# Patient Record
Sex: FEMALE | Race: WHITE
[De-identification: names, ages, dates, MRNs, and addresses within clinical notes are randomized per-mention and may not be internally consistent; named-entity substitution may affect disease eponyms.]

---

## 2017-07-01 ENCOUNTER — HEALTH MAINTENANCE LETTER (OUTPATIENT)
Age: 52
End: 2017-07-01

## 2019-04-29 ENCOUNTER — TELEPHONE (OUTPATIENT)
Dept: SURGERY | Facility: CLINIC | Age: 54
End: 2019-04-29

## 2019-05-01 ENCOUNTER — TELEPHONE (OUTPATIENT)
Dept: SURGERY | Facility: CLINIC | Age: 54
End: 2019-05-01

## 2019-05-01 ENCOUNTER — OFFICE VISIT (OUTPATIENT)
Dept: SURGERY | Facility: CLINIC | Age: 54
End: 2019-05-01
Payer: COMMERCIAL

## 2019-05-01 VITALS
OXYGEN SATURATION: 97 % | BODY MASS INDEX: 40.87 KG/M2 | HEIGHT: 64 IN | WEIGHT: 239.4 LBS | TEMPERATURE: 97.9 F | DIASTOLIC BLOOD PRESSURE: 76 MMHG | SYSTOLIC BLOOD PRESSURE: 117 MMHG | HEART RATE: 75 BPM

## 2019-05-01 DIAGNOSIS — E66.01 MORBID OBESITY (H): Primary | ICD-10-CM

## 2019-05-01 ASSESSMENT — MIFFLIN-ST. JEOR: SCORE: 1661.94

## 2019-05-01 NOTE — PATIENT INSTRUCTIONS
"GOALS:  Relating To Eating:  Eat slowly (20-30 minutes per meal), chewing foods well (25 chews per bite/applesauce consistency)  9\" Plate method (1/2 plate non-starchy vegetables/fruit, 1/4 plate lean protein, 1/4 plate whole grain starch - no more than 1/2 cup carb/meal)  Eat 3 meals per day   -If intermittent fasting try a 16 hour fast: meals from 10am-6 pm    Relating to beverages:  Reduce caffeine/carbonation/calorie containing beverages    Relating to dietary supplements:  Start a multivitamin containing iron daily    Relating to activity:  Increase activity level.  Exercise 3-4 days/week for 45 minutes    Follow up with RD in one month    Stephania Michel, MS, RD, LD  If you need to schedule or reschedule with a dietitian please call [phone].  "

## 2019-05-01 NOTE — NURSING NOTE
"(   Chief Complaint   Patient presents with     Consult     NBS    )    ( Weight: 108.6 kg (239 lb 6.4 oz) )  ( Height: 161.9 cm (5' 3.75\") )  ( BMI (Calculated): 41.42 )  ( Initial Weight: 108.6 kg (239 lb 6.4 oz) )  ( Cumulative weight loss (lbs): 0 )  (   )  (   )  ( Waist Circumference (cm): 126.5 cm )  (   )    ( BP: 117/76 )  (   )  ( Temp: 97.9  F (36.6  C) )  ( Temp src: Oral )  ( Pulse: 75 )  (   )  ( SpO2: 97 % )    ( There is no problem list on file for this patient.   )  (   No current outpatient medications on file.    )  ( Diabetes Eval:    )    ( Pain Eval:  Data Unavailable )    ( Wound Eval:       )    (   History   Smoking Status     Never Smoker   Smokeless Tobacco     Never Used    )    ( Signed By:  Pinky Hallman; May 1, 2019; 8:01 AM )    "

## 2019-05-01 NOTE — PROGRESS NOTES
"New Bariatric Surgery Consultation Note    May 1, 2019    RE: Nery Bustos  MR#: [id number]  : 1965      Referring provider: self referred    Chief Complaint/Reason for visit: evaluation for possible weight loss surgery    Dear Clinic, Diego Jasso (General),    I had the pleasure of seeing your patient, Nery Bustos, to evaluate her obesity and consider her for possible weight loss surgery. As you know, Nery Bustos is 53 year old.  She has a height of 5' 3.75\", a weight of 239 lbs 6.4 oz, and calculated Body mass index is 41.42 kg/m .    HISTORY OF PRESENT ILLNESS:  Weight Loss History Reviewed with Patient 2019   How long have you been overweight? Following one or more pregnancies   What is the most that you have ever weighed? 238   What is the most weight you have lost? 30   I have tried the following methods to lose weight Watching portions or calories, Exercise, Weight Watchers, Atkins type diet (low carb/high protein), OTC Medications   I have tried the following weight loss medications? (Check all that apply) None   Have you ever had weight loss surgery? No   Lost 30 lbs with [facility] in her 20's    CO-MORBIDITIES OF OBESITY INCLUDE:     2019   I have the following co-morbidities associated with obesity: None of the above   Tested for sleep apnea over 10 years ago and didn't have it. No loud snoring    PAST MEDICAL HISTORY:  Past Medical History:   Diagnosis Date     Fracture 2017       PAST SURGICAL HISTORY:  Past Surgical History:   Procedure Laterality Date     ABDOMEN SURGERY  , , ,            FAMILY HISTORY:   Family History   Problem Relation Age of Onset     Substance Abuse Brother         Consistent overuse     Substance Abuse Mother          of alcoholism, heart attack     Substance Abuse Brother          while under influence     Substance Abuse Other          due to alcohol complications in 40's       SOCIAL HISTORY: "   Social History Questions Reviewed With Patient 4/30/2019   Which best describes your employment status (select all that apply) I work full-time, I work days, I work evenings   If you work, what is your occupation?    Which best describes your marital status:    Do you have children? Yes   Who do you have in your support network that can be available to help you for the first 2 weeks after surgery? Daughters, Friend   Who can you count on for support throughout your weight loss surgery journey? Daughters, friends   Can you afford 3 meals a day?  Yes   Can you afford 50-60 dollars a month for vitamins? Yes        HABITS:     4/30/2019   How often do you drink alcohol? Monthly or less   Have you or are you currently using street drugs or prescription strength medication for which you do not have a prescription for? No   Do you have a history of chemical dependency (alcohol or drug abuse)? No       PSYCHOLOGICAL HISTORY:   Psychological History Reviewed With Patient 4/30/2019   Have you ever attempted suicide? Never.   Have you had thoughts of suicide in the past year? No   Have you ever been hospitalized for mental illness or a suicide attempt? Never.   Do you have a history of chronic pain? No   Have you ever been diagnosed with fibromyalgia? No   Are you currently seeing a therapist or counselor?  Yes   Are you currently seeing a psychiatrist? No       ROS:     4/30/2019   Skin:  Leg swelling   HEENT: Missing teeth, Teeth, dentures, or bridges needing repairs   If you answered yes to missing teeth, please indicate how many: 3   Musculoskeletal: None of the above   Cardiovascular: Shortness of breath with activity   Pulmonary: Awaken from sleep to catch your breath   Gastrointestinal: None of the above   Genitourinary: None of the above   Hematological: None of the above   Neurological: None of the above   Female only: Irregular menstrual cycles       EATING BEHAVIORS:     4/30/2019  "  Have you or anyone else thought that you had an eating disorder? No   Do you currently binge eat (eat a large amount of food in a short time)? No   Are you an emotional eater? No   Do you get up to eat after falling asleep? No       EXERCISE:     4/30/2019   How often do you exercise? 3 to 4 times per week   What is the duration of your exercise (in minutes)? 45 Minutes   What types of exercise do you do? walking, home gym, weightlifting   What keeps you from being more active?  Lack of Time, Too tired       MEDICATIONS:  No current outpatient medications on file.       ALLERGIES:  No Known Allergies    LABS/IMAGING/MEDICAL RECORDS REVIEW:     PHYSICAL EXAM:  /76   Pulse 75   Temp 97.9  F (36.6  C) (Oral)   Ht 1.619 m (5' 3.75\")   Wt 108.6 kg (239 lb 6.4 oz)   SpO2 97%   BMI 41.42 kg/m    General: NAD  Neurologic: A & O x 3, gait normal  Head: normocephalic, atraumatic  HEENT: PERRL, EOMI.   Respiratory: respirations unlabored  Abdomen: Obese, Soft NT ND   Extremities: No LE swelling   Skin: warm and dry.  No rashes on exposed skin  Psychiatric: Mentation and Affect appear normal      In summary, Nery Bustos has Class III obesity with a body mass index of Body mass index is 41.42 kg/m . kg/m2 and the comorbidities stated above. She completed an informational seminar and is a candidate for the laparoscopic gastric sleeve.  She will have to complete the following pre-requisites:  If you have not already watched our online seminar please go to [email or web address]    See dietitian in 1 month and monthly for 6 months    Bariatric labs today first floor    Schedule bariatric psych eval with Teetee To at  today OR Call Rich [phone] to schedule with Samantha Cosme    Bariatric Task List  Status:  Is patient a candidate for bariatric surgery?:  patient is a candidate for bariatric surgery -     Status:  surgery evaluation in process -     Surgeon: Mirian -     Tentative surgery month/year: " "Nov 2019 -        Insurance: Insurance:  [facility] -        Patient Info: Initial Weight:  239 -     Date of Initial Weight/Height:  5/1/2019 -     Goal Weight (lbs):  229 -     Required Weight Loss:  10 -     Surgery Type:  sleeve gastrectomy -        Dietician Visits: Structured weight loss required by insurance?:  structured weight loss required -     Dietician Visit 1:  Completed -     Dietician Visit 2:  Needed -     Dietician Visit 3:  Needed -     Dietician Visit 4:  Needed -     Dietician Visit 5:  Needed -     Dietician Visit 6:  Needed -        Psychological Evaluation: Psych eval:  Needed -     Therapist letter of support:  Needed -        Lab Work: Complete Blood Count:  Needed -     Comprehensive Metabolic Panel:  Needed -     Vitamin D:  Needed -     Hgb A1c:  Needed -     PTH:  Needed -        Consults/ Clearance: Cardiac:  Needed -        PCP: Establish care with PCP:  Completed -     PCP letter of support:  Needed -        Smoking:    Patient Education:  Information Session:  Completed -     Given \"Making your decision\" handout?:  Given \"\"Making your decision\"\" handout? -     Given support group information?:  support group contact information provided -     Support plan in place?:  Completed -     Research consents signed?:  research consent signed -        Final Tasks:  Before surgery online class:  Needed -     Before surgery online class website link:  [email or web address]   After surgery online class:  Needed -     After surgery online class website link:  [email or web address]   Nurse visit for weigh-in and information:  Needed -     Pre-assessment clinic visit with anesthesia team for H&P:  Needed -     Final labs (Hgb, plt, T&S, UA):  Needed -        Notes:   -         Today in the office we discussed gastric sleeve surgery. Preoperative, perioperative, and postoperative processes, management, and follow up were addressed.  Risks and benefits were outlined " including the risk of death, staple line leak (1-2%), PE, DVT, ulcer, worsening GERD, N/V, stricture, hernia, wound infection, weight regain, and vitamin deficiencies. I emphasized exercise and activity along with appropriate food choice as the main foundation for weight loss with surgery providing surgical reinforcement of this.  All questions were answered.  A goal sheet and support group handout were given to the patient.      Once the patient has completed the requirements in their task list and there are no further recommendations, the pt will be allowed to see the surgeon of her choice for consultation on the laparoscopic gastric sleeve surgery. Patient verbalizes understanding of the process to surgery and expectations for the postoperative period including the need for lifelong lifestyle changes, vitamin supplementation, and laboratory monitoring.        Sincerely,     Janette Duran PA-C    I spent a total of 30 minutes face to face with the patient during today's office visit. Over 50% of this time was spent counseling the patient and/or coordinating care.

## 2019-05-01 NOTE — LETTER
"5/1/2019       RE: Nery Bustos  [address]  [facility] [address]     Dear Colleague,    Thank you for referring your patient, Nery Bustos, to the [facility] SURGICAL WEIGHT MANAGEMENT at [facility]. Please see a copy of my visit note below.    New Bariatric Nutrition Consultation Note    Reason For Visit: Nutrition Assessment    Nery Bustos is a 53 year old presenting today for new bariatric nutrition consult.  Pt is interested in laparoscopic sleeve gastrectomy with Dr. Tarango expected surgery in Nov 2019.  Patient is accompanied by self.  This is pt's first of 6 required nutrition visits prior to surgery. Pt referred by OBEY Baker (5/1/19).     Support System Reviewed With Patient 4/30/2019   Who do you have in your support network that can be available to help you for the first 2 weeks after surgery? Daughters, Friend   Who can you count on for support throughout your weight loss surgery journey? Daughters, friends       ANTHROPOMETRICS:  Estimated body mass index is 41.42 kg/m  as calculated from the following:    Height as of an earlier encounter on 5/1/19: 1.619 m (5' 3.75\").    Weight as of an earlier encounter on 5/1/19: 108.6 kg (239 lb 6.4 oz).    Required weight loss goal pre-op: 10 lbs from initial consult weight (goal weight 229 lbs or less before surgery)       4/30/2019   I have tried the following methods to lose weight Watching portions or calories, Exercise, Weight Watchers, Atkins type diet (low carb/high protein), OTC Medications       Weight Loss Questions Reviewed With Patient 4/30/2019   How long have you been overweight? Following one or more pregnancies       SUPPLEMENT INFORMATION:  None    NUTRITION HISTORY:  Recall Diet Questions Reviewed With Patient 4/30/2019   Describe what you typically consume for breakfast (typical or most recent): Green juice, eggs, brown rice, spinach, omelets   Describe what you " typically consume for lunch (typical or most recent): unwich from [employer], crisp and green   Describe what you typically consume for supper (typical or most recent): Salad, chicken, steak, veggies, sometimes Asian food (from a resturant, chicken and veggies typically)    Describe what you typically consume as snacks (typical or most recent): Cheese, nuts, sometimes chips ( 2 snacks per day)    How many ounces of water, or other low calorie drinks, do you drink daily (8 oz=1 glass)? 48 oz   How many ounces of caffeine (coffee, tea, pop) do you drink daily (8 oz=1 glass)? 24 oz- sometimes coffee with creamer, sometimes diet coke.    How many ounces of carbonated (pop, beer, sparkling water) drinks do you drinky daily (8 oz=1 glass)? 8 oz-diet coke    How many ounces of juice, pop, sweet tea, sports drinks, protein drinks, other sweetened drinks, do you drink daily (8 oz=1 glass)? 16 oz- sometimes lemonade   How many ounces of milk do you drink daily (8 oz=1 glass) None    Please indicate the type of milk: skim   How often do you drink alcohol? Monthly or less   Skips breakfast a couple times a week.     Eating Habits 4/30/2019   Do you have any dietary restrictions? No   Do you currently binge eat (eat a large amount of food in a short time)? No   Are you an emotional eater? No   Do you get up to eat after falling asleep? No   What foods do you crave?  food, sugar       ADDITIONAL INFORMATION:  Does not like to cook.  Does have refriderators or microwave.   Intermittented fasting - not every day, but doing an 18 hour fast.    Overall pt states that she is frustrated because she is not losing weight even though she is exercising and make better food choices.     Dining Out History Reviewed With Patient 4/30/2019   How often do you dine out? Nearly every day.   Where do you dine out? (select all that apply) sit-down restaurants   What types of food do you order when you dine out? omelets, salads, rice bowls,  "Asian food   typically dine out for two meals daily     Physical Activity Reviewed With Patient 4/30/2019   How often do you exercise? 3 to 4 times per week   What is the duration of your exercise (in minutes)? 45 Minutes   What types of exercise do you do? walking, home gym, weightlifting   What keeps you from being more active?  Lack of Time, Too tired       NUTRITION DIAGNOSIS:  Obesity r/t long history of self-monitoring deficit and excessive energy intake aeb BMI >30 kg/m2.    INTERVENTION:  Intervention Provided/Education Provided on post-op diet guidelines, vitamins/minerals essential post-operatively, GI anatomy of bariatric surgeries, ways to help prepare for post-op diet guidelines pre-operatively, portion/calorie-control, mindful eating, source of protein.  Provided pt with list of goals RD contact information.    - Briefly discussed eating out, will revisit this during next RD visit.     Questions Reviewed With Patient 4/30/2019   How ready are you to make changes regarding your weight? Number 1 = Not ready at all to make changes up to 10 = very ready. 10   How confident are you that you can change? 1 = Not confident that you will be successful making changes up to 10 = very confident. 10       Patient Understanding: fair-good  Expected Compliance: fair-good    GOALS:  Relating To Eating:  Eat slowly (20-30 minutes per meal), chewing foods well (25 chews per bite/applesauce consistency)  9\" Plate method (1/2 plate non-starchy vegetables/fruit, 1/4 plate lean protein, 1/4 plate whole grain starch - no more than 1/2 cup carb/meal)  Eat 3 meals per day   -If intermittent fasting try a 16 hour fast: meals from 10am-6 pm    Relating to beverages:  Reduce caffeine/carbonation/calorie containing beverages    Relating to dietary supplements:  Start a multivitamin containing iron daily    Relating to activity:  Increase activity level.  Exercise 3-4 days/week for 45 minutes      Follow-Up:   Recommend 1 month " follow up visits to assist with lifestyle changes or per insurance.      Time spent with patient: 30 minutes.    Again, thank you for allowing me to participate in the care of your patient.      Sincerely,    Stephania Michel RD

## 2019-05-01 NOTE — LETTER
"2019       RE: Nery Bustos  [address]  [facility] [address]     Dear Colleague,    Thank you for referring your patient, Nery Bustos, to the [facility] SURGICAL WEIGHT MANAGEMENT at [facility]. Please see a copy of my visit note below.    New Bariatric Surgery Consultation Note    May 1, 2019    RE: Nery Bustos  MR#: [id number]  : 1965      Referring provider: self referred    Chief Complaint/Reason for visit: evaluation for possible weight loss surgery    Dear Clinic, Diego Jasso (General),    I had the pleasure of seeing your patient, Nery Bustos, to evaluate her obesity and consider her for possible weight loss surgery. As you know, Nery Bustos is 53 year old.  She has a height of 5' 3.75\", a weight of 239 lbs 6.4 oz, and calculated Body mass index is 41.42 kg/m .    HISTORY OF PRESENT ILLNESS:  Weight Loss History Reviewed with Patient 2019   How long have you been overweight? Following one or more pregnancies   What is the most that you have ever weighed? 238   What is the most weight you have lost? 30   I have tried the following methods to lose weight Watching portions or calories, Exercise, Weight Watchers, Atkins type diet (low carb/high protein), OTC Medications   I have tried the following weight loss medications? (Check all that apply) None   Have you ever had weight loss surgery? No   Lost 30 lbs with WW in her 20's    CO-MORBIDITIES OF OBESITY INCLUDE:     2019   I have the following co-morbidities associated with obesity: None of the above   Tested for sleep apnea over 10 years ago and didn't have it. No loud snoring    PAST MEDICAL HISTORY:  Past Medical History:   Diagnosis Date     Fracture 2017       PAST SURGICAL HISTORY:  Past Surgical History:   Procedure Laterality Date     ABDOMEN SURGERY  , , ,            FAMILY HISTORY:   Family History   Problem " Relation Age of Onset     Substance Abuse Brother         Consistent overuse     Substance Abuse Mother          of alcoholism, heart attack     Substance Abuse Brother          while under influence     Substance Abuse Other          due to alcohol complications in 40's       SOCIAL HISTORY:   Social History Questions Reviewed With Patient 2019   Which best describes your employment status (select all that apply) I work full-time, I work days, I work evenings   If you work, what is your occupation?    Which best describes your marital status:    Do you have children? Yes   Who do you have in your support network that can be available to help you for the first 2 weeks after surgery? Daughters, Friend   Who can you count on for support throughout your weight loss surgery journey? Daughters, friends   Can you afford 3 meals a day?  Yes   Can you afford 50-60 dollars a month for vitamins? Yes        HABITS:     2019   How often do you drink alcohol? Monthly or less   Have you or are you currently using street drugs or prescription strength medication for which you do not have a prescription for? No   Do you have a history of chemical dependency (alcohol or drug abuse)? No       PSYCHOLOGICAL HISTORY:   Psychological History Reviewed With Patient 2019   Have you ever attempted suicide? Never.   Have you had thoughts of suicide in the past year? No   Have you ever been hospitalized for mental illness or a suicide attempt? Never.   Do you have a history of chronic pain? No   Have you ever been diagnosed with fibromyalgia? No   Are you currently seeing a therapist or counselor?  Yes   Are you currently seeing a psychiatrist? No       ROS:     2019   Skin:  Leg swelling   HEENT: Missing teeth, Teeth, dentures, or bridges needing repairs   If you answered yes to missing teeth, please indicate how many: 3   Musculoskeletal: None of the above   Cardiovascular:  "Shortness of breath with activity   Pulmonary: Awaken from sleep to catch your breath   Gastrointestinal: None of the above   Genitourinary: None of the above   Hematological: None of the above   Neurological: None of the above   Female only: Irregular menstrual cycles       EATING BEHAVIORS:     4/30/2019   Have you or anyone else thought that you had an eating disorder? No   Do you currently binge eat (eat a large amount of food in a short time)? No   Are you an emotional eater? No   Do you get up to eat after falling asleep? No       EXERCISE:     4/30/2019   How often do you exercise? 3 to 4 times per week   What is the duration of your exercise (in minutes)? 45 Minutes   What types of exercise do you do? walking, home gym, weightlifting   What keeps you from being more active?  Lack of Time, Too tired       MEDICATIONS:  No current outpatient medications on file.       ALLERGIES:  No Known Allergies    LABS/IMAGING/MEDICAL RECORDS REVIEW:     PHYSICAL EXAM:  /76   Pulse 75   Temp 97.9  F (36.6  C) (Oral)   Ht 1.619 m (5' 3.75\")   Wt 108.6 kg (239 lb 6.4 oz)   SpO2 97%   BMI 41.42 kg/m     General: NAD  Neurologic: A & O x 3, gait normal  Head: normocephalic, atraumatic  HEENT: PERRL, EOMI.   Respiratory: respirations unlabored  Abdomen: Obese, Soft NT ND   Extremities: No LE swelling   Skin: warm and dry.  No rashes on exposed skin  Psychiatric: Mentation and Affect appear normal      In summary, Nery Bustos has Class III obesity with a body mass index of Body mass index is 41.42 kg/m . kg/m2 and the comorbidities stated above. She completed an informational seminar and is a candidate for the laparoscopic gastric sleeve.  She will have to complete the following pre-requisites:  If you have not already watched our online seminar please go to [email or web address]    See dietitian in 1 month and monthly for 6 months    Bariatric labs today first floor    Schedule bariatric psych eval with Teetee " "Petrik at  today OR Call Rich [phone] to schedule with Samantha Cosme    Bariatric Task List  Status:  Is patient a candidate for bariatric surgery?:  patient is a candidate for bariatric surgery -     Status:  surgery evaluation in process -     Surgeon: Mirian -     Tentative surgery month/year: Nov 2019 -        Insurance: Insurance:  [facility] -        Patient Info: Initial Weight:  239 -     Date of Initial Weight/Height:  5/1/2019 -     Goal Weight (lbs):  229 -     Required Weight Loss:  10 -     Surgery Type:  sleeve gastrectomy -        Dietician Visits: Structured weight loss required by insurance?:  structured weight loss required -     Dietician Visit 1:  Completed -     Dietician Visit 2:  Needed -     Dietician Visit 3:  Needed -     Dietician Visit 4:  Needed -     Dietician Visit 5:  Needed -     Dietician Visit 6:  Needed -        Psychological Evaluation: Psych eval:  Needed -     Therapist letter of support:  Needed -        Lab Work: Complete Blood Count:  Needed -     Comprehensive Metabolic Panel:  Needed -     Vitamin D:  Needed -     Hgb A1c:  Needed -     PTH:  Needed -        Consults/ Clearance: Cardiac:  Needed -        PCP: Establish care with PCP:  Completed -     PCP letter of support:  Needed -        Smoking:    Patient Education:  Information Session:  Completed -     Given \"Making your decision\" handout?:  Given \"\"Making your decision\"\" handout? -     Given support group information?:  support group contact information provided -     Support plan in place?:  Completed -     Research consents signed?:  research consent signed -        Final Tasks:  Before surgery online class:  Needed -     Before surgery online class website link:  [email or web address]   After surgery online class:  Needed -     After surgery online class website link:  [email or web address]   Nurse visit for weigh-in and information:  Needed -     Pre-assessment clinic " visit with anesthesia team for H&P:  Needed -     Final labs (Hgb, plt, T&S, UA):  Needed -        Notes:   -         Today in the office we discussed gastric sleeve surgery. Preoperative, perioperative, and postoperative processes, management, and follow up were addressed.  Risks and benefits were outlined including the risk of death, staple line leak (1-2%), PE, DVT, ulcer, worsening GERD, N/V, stricture, hernia, wound infection, weight regain, and vitamin deficiencies. I emphasized exercise and activity along with appropriate food choice as the main foundation for weight loss with surgery providing surgical reinforcement of this.  All questions were answered.  A goal sheet and support group handout were given to the patient.      Once the patient has completed the requirements in their task list and there are no further recommendations, the pt will be allowed to see the surgeon of her choice for consultation on the laparoscopic gastric sleeve surgery. Patient verbalizes understanding of the process to surgery and expectations for the postoperative period including the need for lifelong lifestyle changes, vitamin supplementation, and laboratory monitoring.      I spent a total of 30 minutes face to face with the patient during today's office visit. Over 50% of this time was spent counseling the patient and/or coordinating care.    Again, thank you for allowing me to participate in the care of your patient.      Sincerely,    Janette Duran PA-C

## 2019-05-01 NOTE — PROGRESS NOTES
"New Bariatric Nutrition Consultation Note    Reason For Visit: Nutrition Assessment    Nery Bustos is a 53 year old presenting today for new bariatric nutrition consult.  Pt is interested in laparoscopic sleeve gastrectomy with Dr. Tarango expected surgery in Nov 2019.  Patient is accompanied by self.  This is pt's first of 6 required nutrition visits prior to surgery. Pt referred by OBEY Baker (5/1/19).     Support System Reviewed With Patient 4/30/2019   Who do you have in your support network that can be available to help you for the first 2 weeks after surgery? Daughters, Friend   Who can you count on for support throughout your weight loss surgery journey? Daughters, friends       ANTHROPOMETRICS:  Estimated body mass index is 41.42 kg/m  as calculated from the following:    Height as of an earlier encounter on 5/1/19: 1.619 m (5' 3.75\").    Weight as of an earlier encounter on 5/1/19: 108.6 kg (239 lb 6.4 oz).    Required weight loss goal pre-op: 10 lbs from initial consult weight (goal weight 229 lbs or less before surgery)       4/30/2019   I have tried the following methods to lose weight Watching portions or calories, Exercise, Weight Watchers, Atkins type diet (low carb/high protein), OTC Medications       Weight Loss Questions Reviewed With Patient 4/30/2019   How long have you been overweight? Following one or more pregnancies       SUPPLEMENT INFORMATION:  None    NUTRITION HISTORY:  Recall Diet Questions Reviewed With Patient 4/30/2019   Describe what you typically consume for breakfast (typical or most recent): Green juice, eggs, brown rice, spinach, omelets   Describe what you typically consume for lunch (typical or most recent): unwich from [employer], crisp and green   Describe what you typically consume for supper (typical or most recent): Salad, chicken, steak, veggies, sometimes Asian food (from a resturant, chicken and veggies typically)    Describe what you typically consume " as snacks (typical or most recent): Cheese, nuts, sometimes chips ( 2 snacks per day)    How many ounces of water, or other low calorie drinks, do you drink daily (8 oz=1 glass)? 48 oz   How many ounces of caffeine (coffee, tea, pop) do you drink daily (8 oz=1 glass)? 24 oz- sometimes coffee with creamer, sometimes diet coke.    How many ounces of carbonated (pop, beer, sparkling water) drinks do you drinky daily (8 oz=1 glass)? 8 oz-diet coke    How many ounces of juice, pop, sweet tea, sports drinks, protein drinks, other sweetened drinks, do you drink daily (8 oz=1 glass)? 16 oz- sometimes lemonade   How many ounces of milk do you drink daily (8 oz=1 glass) None    Please indicate the type of milk: skim   How often do you drink alcohol? Monthly or less   Skips breakfast a couple times a week.     Eating Habits 4/30/2019   Do you have any dietary restrictions? No   Do you currently binge eat (eat a large amount of food in a short time)? No   Are you an emotional eater? No   Do you get up to eat after falling asleep? No   What foods do you crave?  food, sugar       ADDITIONAL INFORMATION:  Does not like to cook.  Does have refriderators or microwave.   Intermittented fasting - not every day, but doing an 18 hour fast.    Overall pt states that she is frustrated because she is not losing weight even though she is exercising and make better food choices.     Dining Out History Reviewed With Patient 4/30/2019   How often do you dine out? Nearly every day.   Where do you dine out? (select all that apply) sit-down restaurants   What types of food do you order when you dine out? omelets, salads, rice bowls, Asian food   typically dine out for two meals daily     Physical Activity Reviewed With Patient 4/30/2019   How often do you exercise? 3 to 4 times per week   What is the duration of your exercise (in minutes)? 45 Minutes   What types of exercise do you do? walking, home gym, weightlifting   What keeps you from  "being more active?  Lack of Time, Too tired       NUTRITION DIAGNOSIS:  Obesity r/t long history of self-monitoring deficit and excessive energy intake aeb BMI >30 kg/m2.    INTERVENTION:  Intervention Provided/Education Provided on post-op diet guidelines, vitamins/minerals essential post-operatively, GI anatomy of bariatric surgeries, ways to help prepare for post-op diet guidelines pre-operatively, portion/calorie-control, mindful eating, source of protein.  Provided pt with list of goals RD contact information.    - Briefly discussed eating out, will revisit this during next RD visit.     Questions Reviewed With Patient 4/30/2019   How ready are you to make changes regarding your weight? Number 1 = Not ready at all to make changes up to 10 = very ready. 10   How confident are you that you can change? 1 = Not confident that you will be successful making changes up to 10 = very confident. 10       Patient Understanding: fair-good  Expected Compliance: fair-good    GOALS:  Relating To Eating:  Eat slowly (20-30 minutes per meal), chewing foods well (25 chews per bite/applesauce consistency)  9\" Plate method (1/2 plate non-starchy vegetables/fruit, 1/4 plate lean protein, 1/4 plate whole grain starch - no more than 1/2 cup carb/meal)  Eat 3 meals per day   -If intermittent fasting try a 16 hour fast: meals from 10am-6 pm    Relating to beverages:  Reduce caffeine/carbonation/calorie containing beverages    Relating to dietary supplements:  Start a multivitamin containing iron daily    Relating to activity:  Increase activity level.  Exercise 3-4 days/week for 45 minutes      Follow-Up:   Recommend 1 month follow up visits to assist with lifestyle changes or per insurance.      Time spent with patient: 30 minutes.  Stephania Michel, MS, RD, LD     "

## 2019-05-01 NOTE — TELEPHONE ENCOUNTER
Patient called requesting I contact her insurance co to see if she has coverage and if she needs 3 vs 6 RD visits.  Contacted [employer], was told it is a [facility] policy. Called [facility] and they said it was a [facility] policy. Call [facility] and under the [facility] ID number was told there is an exclusion on the policy for bariatric surgery.   Called patient back, gave her the information I got and requested she call [facility] back, tell them I spoke to 3 people, and find out if there is in fact an exclusion on the policy. Patient states she had called earlier and was told there is coverage. She will call me back with their response.

## 2019-05-01 NOTE — PATIENT INSTRUCTIONS
"If you have not already watched our online seminar please go to [email or web address]    See dietitian in 1 month and monthly for 6 months    Bariatric labs today first floor    Schedule bariatric psych eval with Teetee To at  today OR Call Rich [phone] to schedule with Samantha Cosme    Bariatric Task List  Status:  Is patient a candidate for bariatric surgery?:  patient is a candidate for bariatric surgery -     Status:  surgery evaluation in process -     Surgeon: Mirian Sandoval surgery month/year: Nov 2019 -        Insurance: Insurance:  [facility] -        Patient Info: Initial Weight:  239 -     Date of Initial Weight/Height:  5/1/2019 -     Goal Weight (lbs):  229 -     Required Weight Loss:  10 -     Surgery Type:  sleeve gastrectomy -        Dietician Visits: Structured weight loss required by insurance?:  structured weight loss required -     Dietician Visit 1:  Completed -     Dietician Visit 2:  Needed -     Dietician Visit 3:  Needed -     Dietician Visit 4:  Needed -     Dietician Visit 5:  Needed -     Dietician Visit 6:  Needed -        Psychological Evaluation: Psych eval:  Needed -     Therapist letter of support:  Needed -        Lab Work: Complete Blood Count:  Needed -     Comprehensive Metabolic Panel:  Needed -     Vitamin D:  Needed -     Hgb A1c:  Needed -     PTH:  Needed -        Consults/ Clearance: Cardiac:  Needed -        PCP: Establish care with PCP:  Completed -     PCP letter of support:  Needed -        Smoking:    Patient Education:  Information Session:  Completed -     Given \"Making your decision\" handout?:  Given \"\"Making your decision\"\" handout? -     Given support group information?:  support group contact information provided -     Support plan in place?:  Completed -     Research consents signed?:  research consent signed -        Final Tasks:  Before surgery online class:  Needed -     Before surgery online class website link:  " [email or web address]   After surgery online class:  Needed -     After surgery online class website link:  [email or web address]   Nurse visit for weigh-in and information:  Needed -     Pre-assessment clinic visit with anesthesia team for H&P:  Needed -     Final labs (Hgb, plt, T&S, UA):  Needed -        Notes:   -

## 2019-05-14 ENCOUNTER — OFFICE VISIT (OUTPATIENT)
Dept: FAMILY MEDICINE | Facility: CLINIC | Age: 54
End: 2019-05-14
Payer: COMMERCIAL

## 2019-05-14 VITALS
RESPIRATION RATE: 16 BRPM | WEIGHT: 235 LBS | HEART RATE: 85 BPM | SYSTOLIC BLOOD PRESSURE: 116 MMHG | BODY MASS INDEX: 41.64 KG/M2 | TEMPERATURE: 97.9 F | DIASTOLIC BLOOD PRESSURE: 79 MMHG | HEIGHT: 63 IN | OXYGEN SATURATION: 97 %

## 2019-05-14 DIAGNOSIS — E66.01 MORBID OBESITY (H): ICD-10-CM

## 2019-05-14 DIAGNOSIS — Z13.220 SCREENING FOR LIPOID DISORDERS: ICD-10-CM

## 2019-05-14 DIAGNOSIS — Z12.31 ENCOUNTER FOR SCREENING MAMMOGRAM FOR BREAST CANCER: ICD-10-CM

## 2019-05-14 DIAGNOSIS — Z11.4 SCREENING FOR HIV WITHOUT PRESENCE OF RISK FACTORS: ICD-10-CM

## 2019-05-14 DIAGNOSIS — Z11.59 ENCOUNTER FOR HCV SCREENING TEST FOR LOW RISK PATIENT: ICD-10-CM

## 2019-05-14 DIAGNOSIS — R53.82 CHRONIC FATIGUE: Primary | ICD-10-CM

## 2019-05-14 LAB
ALBUMIN SERPL-MCNC: 3.8 G/DL (ref 3.4–5)
ALP SERPL-CCNC: 53 U/L (ref 40–150)
ALT SERPL W P-5'-P-CCNC: 38 U/L (ref 0–50)
ANION GAP SERPL CALCULATED.3IONS-SCNC: 10 MMOL/L (ref 3–14)
AST SERPL W P-5'-P-CCNC: 20 U/L (ref 0–45)
BILIRUB SERPL-MCNC: 0.4 MG/DL (ref 0.2–1.3)
BUN SERPL-MCNC: 15 MG/DL (ref 7–30)
CALCIUM SERPL-MCNC: 8.7 MG/DL (ref 8.5–10.1)
CHLORIDE SERPL-SCNC: 106 MMOL/L (ref 94–109)
CHOLEST SERPL-MCNC: 237 MG/DL
CO2 SERPL-SCNC: 22 MMOL/L (ref 20–32)
CREAT SERPL-MCNC: 0.65 MG/DL (ref 0.52–1.04)
CRP SERPL-MCNC: <2.9 MG/L (ref 0–8)
DEPRECATED CALCIDIOL+CALCIFEROL SERPL-MC: 15 UG/L (ref 20–75)
ERYTHROCYTE [DISTWIDTH] IN BLOOD BY AUTOMATED COUNT: 12.4 % (ref 10–15)
ERYTHROCYTE [SEDIMENTATION RATE] IN BLOOD BY WESTERGREN METHOD: 8 MM/H (ref 0–30)
FERRITIN SERPL-MCNC: 21 NG/ML (ref 8–252)
FOLATE SERPL-MCNC: 14.7 NG/ML
GFR SERPL CREATININE-BSD FRML MDRD: >90 ML/MIN/{1.73_M2}
GLUCOSE SERPL-MCNC: 92 MG/DL (ref 70–99)
HBA1C MFR BLD: 5.7 % (ref 0–5.6)
HCT VFR BLD AUTO: 43.9 % (ref 35–47)
HCV AB SERPL QL IA: NONREACTIVE
HDLC SERPL-MCNC: 36 MG/DL
HGB BLD-MCNC: 14.4 G/DL (ref 11.7–15.7)
HIV 1+2 AB+HIV1 P24 AG SERPL QL IA: NONREACTIVE
LDLC SERPL CALC-MCNC: 133 MG/DL
MCH RBC QN AUTO: 30.2 PG (ref 26.5–33)
MCHC RBC AUTO-ENTMCNC: 32.8 G/DL (ref 31.5–36.5)
MCV RBC AUTO: 92 FL (ref 78–100)
NONHDLC SERPL-MCNC: 201 MG/DL
PLATELET # BLD AUTO: 319 10E9/L (ref 150–450)
POTASSIUM SERPL-SCNC: 4 MMOL/L (ref 3.4–5.3)
PROT SERPL-MCNC: 7.7 G/DL (ref 6.8–8.8)
PTH-INTACT SERPL-MCNC: 61 PG/ML (ref 18–80)
RBC # BLD AUTO: 4.77 10E12/L (ref 3.8–5.2)
SODIUM SERPL-SCNC: 138 MMOL/L (ref 133–144)
TRIGL SERPL-MCNC: 341 MG/DL
TSH SERPL DL<=0.005 MIU/L-ACNC: 1.94 MU/L (ref 0.4–4)
VIT B12 SERPL-MCNC: 384 PG/ML (ref 193–986)
WBC # BLD AUTO: 7.1 10E9/L (ref 4–11)

## 2019-05-14 SDOH — HEALTH STABILITY: MENTAL HEALTH: HOW OFTEN DO YOU HAVE 6 OR MORE DRINKS ON ONE OCCASION?: NEVER

## 2019-05-14 SDOH — HEALTH STABILITY: MENTAL HEALTH: HOW MANY STANDARD DRINKS CONTAINING ALCOHOL DO YOU HAVE ON A TYPICAL DAY?: 1 OR 2

## 2019-05-14 SDOH — HEALTH STABILITY: MENTAL HEALTH: HOW OFTEN DO YOU HAVE A DRINK CONTAINING ALCOHOL?: MONTHLY OR LESS

## 2019-05-14 ASSESSMENT — ANXIETY QUESTIONNAIRES
1. FEELING NERVOUS, ANXIOUS, OR ON EDGE: NOT AT ALL
5. BEING SO RESTLESS THAT IT IS HARD TO SIT STILL: NOT AT ALL
IF YOU CHECKED OFF ANY PROBLEMS ON THIS QUESTIONNAIRE, HOW DIFFICULT HAVE THESE PROBLEMS MADE IT FOR YOU TO DO YOUR WORK, TAKE CARE OF THINGS AT HOME, OR GET ALONG WITH OTHER PEOPLE: NOT DIFFICULT AT ALL
3. WORRYING TOO MUCH ABOUT DIFFERENT THINGS: NOT AT ALL
GAD7 TOTAL SCORE: 0
6. BECOMING EASILY ANNOYED OR IRRITABLE: NOT AT ALL
7. FEELING AFRAID AS IF SOMETHING AWFUL MIGHT HAPPEN: NOT AT ALL
2. NOT BEING ABLE TO STOP OR CONTROL WORRYING: NOT AT ALL

## 2019-05-14 ASSESSMENT — MIFFLIN-ST. JEOR: SCORE: 1630.08

## 2019-05-14 ASSESSMENT — PATIENT HEALTH QUESTIONNAIRE - PHQ9
SUM OF ALL RESPONSES TO PHQ QUESTIONS 1-9: 3
5. POOR APPETITE OR OVEREATING: NOT AT ALL

## 2019-05-14 NOTE — PATIENT INSTRUCTIONS
*Food Sensitivities:   [employer]: [phone] or [phone]  [email or web address]  This is not covered by most insurance companies. Please call ahead and ask about pricing to see if intolerance to certain foods is triggering your ongoing fatigue.     *Keep a food journal this next week or two, please note when you start to have the extreme fatigue after eating to see if we can start to correlate your symptoms to a certain type of food.     *Sleep study to see if you have obstructive sleep apnea which may be causing ongoing fatigue and difficulty with weight loss.     *Schedule a routine physical to have pap, discuss colonoscopy/colorectal cancer screening. If you would like to volunteer at a hospital we can also check titers at that time to see if you have immunity to measles, mumps, rubella, and chicken pox.

## 2019-05-14 NOTE — NURSING NOTE
"53 year old  Chief Complaint   Patient presents with     Consult     Pt. presents to the clinic today feeling fatigue for 20 years. Shortness of breath X 3 to 6 months. Concerns about weight.        Blood pressure 116/79, pulse 85, temperature 97.9  F (36.6  C), temperature source Oral, resp. rate 16, height 1.6 m (5' 3\"), weight 106.6 kg (235 lb), last menstrual period 04/30/2019, SpO2 97 %. Body mass index is 41.63 kg/m .  BP completed using cuff size:    There is no problem list on file for this patient.      Wt Readings from Last 2 Encounters:   05/14/19 106.6 kg (235 lb)   05/01/19 108.6 kg (239 lb 6.4 oz)     BP Readings from Last 3 Encounters:   05/14/19 116/79   05/01/19 117/76   02/05/15 124/76       No Known Allergies    No current outpatient medications on file.     No current facility-administered medications for this visit.        Social History     Tobacco Use     Smoking status: Never Smoker     Smokeless tobacco: Never Used   Substance Use Topics     Alcohol use: Not Currently     Comment: occasional drink     Drug use: Never       Honoring Choices - Health Care Directive Guide offered to patient at time of visit.    Health Maintenance Due   Topic Date Due     PREVENTIVE CARE VISIT  1965     HIV SCREEN (SYSTEM ASSIGNED)  06/27/1981     HEPATITIS C SCREENING  06/27/1981     PAP SCREENING Q3 YR (SYSTEM ASSIGNED)  06/27/1984     DTAP/TDAP/TD IMMUNIZATION (1 - Tdap) 06/27/1988     MAMMO SCREEN Q2 YR (SYSTEM ASSIGNED)  06/27/2003     LIPID SCREEN Q5 YR FEMALE (SYSTEM ASSIGNED)  06/27/2008     COLON CANCER SCREEN (SYSTEM ASSIGNED)  06/27/2013     ZOSTER IMMUNIZATION (1 of 2) 06/27/2013     ADVANCE DIRECTIVE PLANNING Q5 YRS  06/27/2018         There is no immunization history on file for this patient.    No results found for: PAP      Recent Labs   Lab Test 02/05/15  1046   A1C 5.5   TSH 1.03       PHQ-2 ( 1999 Pfizer) 5/14/2019 4/30/2019   Q1: Little interest or pleasure in doing things 0 0   Q2: " Feeling down, depressed or hopeless 0 0   PHQ-2 Score 0 0   Q1: Little interest or pleasure in doing things - Not at all   Q2: Feeling down, depressed or hopeless - Not at all   PHQ-2 Score - 0       PHQ-9 SCORE 5/14/2019   PHQ-9 Total Score 3       HERIBERTO-7 SCORE 5/14/2019   Total Score 0       No flowsheet data found.    Arelis Crandall CMA  May 14, 2019 9:38 AM

## 2019-05-14 NOTE — PROGRESS NOTES
"       HPI       Nery Rocio Bustos is a 53 year old  who presents for   Chief Complaint   Patient presents with     Consult     Pt. presents to the clinic today feeling fatigue for 20 years. Shortness of breath X 3 to 6 months. Concerns about weight.        Has had ongoing fagitue for past 20 years \"since my last kid\". Exercises (walks 2-3 miles 3 days/week), is trying to eat well, does endorse regular sleep (7-8 hours uninterrupted nightly). Does believe she snores, will easily fall asleep when sitting and reading or watching T.V. Does not wake with morning headaches of have issues with tiredness while driving. Uncertain if the has any periods of apnea. Has a sleep study roughly 10-15 years ago and was normal. Uncertain if she has any food sensitivities or allergies, has noted that after eating will have 'extreme fatigue\" where she has difficulty staying wake upwards of 5 times a week. Denies any abdominal pain, bloating, constipation or diarrhea. No overt joint aches or pain, does endorse stiffness with sitting for prolonged periods of time and will often stretch before she starts having to walk after being sedentary for a period of time. No blood in stool. Has not had a preventive physical exam in several years and is overdue for pap, colonoscopy/colorectal cancer screening, lipid screening, and mammogram.     Shortness of breath: intermittent. Does have history of getting winded with very little activity. Denies any routine cough, overt dyspnea, cyanosis, wheezing, or stridor. Does have shortness of breath with going up a flight of stairs. No known cardiopulmonary diseases or complaints. No angina. Endorses a friend has told her she becomes winded very easily.     Obesity: considered having gastric bypass or gastric sleeve surgery. Was declined by her insurance. Uncertain as to why. Has struggled with her weightWeight after birth of last child was 195lbs, has been slowly gaining since. Is up 10lbs from last " "year without any noticible changes to her diet or lifestyle. Has tried weight watchers, counting calories, exercise, OTC medications, low carb and high protein diets in past with little success.     Problem, Medication and Allergy Lists were       Current Outpatient Medications   Medication Sig Dispense Refill     calcium carbonate (TUMS) 500 MG chewable tablet Take 1 tablet (500 mg) by mouth 2 times daily 60 tablet 11     multivitamin CF FORMULA (CHOICEFUL) chewable tablet Take 1 tablet by mouth daily       vitamin D2 (ERGOCALCIFEROL) 09258 units (1250 mcg) capsule Take 1 capsule (50,000 Units) by mouth every 7 days for 8 doses 8 capsule 0       No Known Allergies.    Patient is a new patient to this clinic and so  I reviewed/updated the Past Medical History, the Family History and the Social History .         Review of Systems:   Review of Systems   Constitutional: Positive for fatigue. Negative for activity change, appetite change, chills, fever and unexpected weight change.   Respiratory: Positive for shortness of breath. Negative for apnea, cough, choking and wheezing.    Cardiovascular: Negative for chest pain and palpitations.   Gastrointestinal: Negative for abdominal distention, abdominal pain, blood in stool, constipation, diarrhea, nausea and vomiting.   Endocrine: Negative for polydipsia, polyphagia and polyuria.   Skin: Negative for rash and wound.   Neurological: Negative for dizziness, tremors, weakness, numbness and headaches.   Psychiatric/Behavioral: Negative for behavioral problems, dysphoric mood, self-injury, sleep disturbance and suicidal ideas. The patient is not nervous/anxious.             Physical Exam:     Vitals:    05/14/19 0937   BP: 116/79   BP Location: Left arm   Patient Position: Chair   Cuff Size: Adult Regular   Pulse: 85   Resp: 16   Temp: 97.9  F (36.6  C)   TempSrc: Oral   SpO2: 97%   Weight: 106.6 kg (235 lb)   Height: 1.6 m (5' 3\")     Body mass index is 41.63 kg/m .  Vitals " were reviewed and were normal     Physical Exam   Constitutional: She is oriented to person, place, and time. She appears well-developed and well-nourished. No distress.   HENT:   Head: Normocephalic and atraumatic.   Right Ear: External ear normal.   Left Ear: External ear normal.   Nose: Nose normal.   Eyes: Pupils are equal, round, and reactive to light. Conjunctivae and EOM are normal. Right eye exhibits no discharge. Left eye exhibits no discharge. No scleral icterus.   Neck: Normal range of motion. Neck supple. No thyromegaly present.   Cardiovascular: Normal rate, regular rhythm and normal heart sounds. Exam reveals no gallop and no friction rub.   No murmur heard.  Pulmonary/Chest: Effort normal and breath sounds normal. No stridor. No respiratory distress. She has no wheezes. She has no rales. She exhibits no tenderness.   Abdominal: Soft. Bowel sounds are normal. She exhibits no distension and no mass. There is no tenderness. There is no rebound and no guarding. No hernia.   Lymphadenopathy:     She has no cervical adenopathy.   Neurological: She is alert and oriented to person, place, and time.   Skin: Skin is warm and dry. Capillary refill takes less than 2 seconds. No rash noted. She is not diaphoretic. No erythema. No pallor.   Psychiatric: She has a normal mood and affect. Her behavior is normal.   Nursing note and vitals reviewed.        Results:      Results from this visit  Results for orders placed or performed in visit on 05/14/19   Folate   Result Value Ref Range    Folate 14.7 >5.4 ng/mL   Vitamin B12   Result Value Ref Range    Vitamin B12 384 193 - 986 pg/mL   Ferritin   Result Value Ref Range    Ferritin 21 8 - 252 ng/mL   TSH with free T4 reflex   Result Value Ref Range    TSH 1.94 0.40 - 4.00 mU/L   Erythrocyte sedimentation rate auto   Result Value Ref Range    Sed Rate 8 0 - 30 mm/h   CRP inflammation   Result Value Ref Range    CRP Inflammation <2.9 0.0 - 8.0 mg/L   Lipid panel reflex  "to direct LDL Fasting   Result Value Ref Range    Cholesterol 237 (H) <200 mg/dL    Triglycerides 341 (H) <150 mg/dL    HDL Cholesterol 36 (L) >49 mg/dL    LDL Cholesterol Calculated 133 (H) <100 mg/dL    Non HDL Cholesterol 201 (H) <130 mg/dL   Hepatitis C antibody   Result Value Ref Range    Hepatitis C Antibody Nonreactive NR^Nonreactive   HIV Antigen Antibody Combo   Result Value Ref Range    HIV Antigen Antibody Combo Nonreactive NR^Nonreactive       Vitamin D Deficiency   Result Value Ref Range    Vitamin D Deficiency screening 15 (L) 20 - 75 ug/L   Parathyroid Hormone Intact   Result Value Ref Range    Parathyroid Hormone Intact 61 18 - 80 pg/mL   Hemoglobin A1c   Result Value Ref Range    Hemoglobin A1C 5.7 (H) 0 - 5.6 %   Comprehensive metabolic panel   Result Value Ref Range    Sodium 138 133 - 144 mmol/L    Potassium 4.0 3.4 - 5.3 mmol/L    Chloride 106 94 - 109 mmol/L    Carbon Dioxide 22 20 - 32 mmol/L    Anion Gap 10 3 - 14 mmol/L    Glucose 92 70 - 99 mg/dL    Urea Nitrogen 15 7 - 30 mg/dL    Creatinine 0.65 0.52 - 1.04 mg/dL    GFR Estimate >90 >60 mL/min/[1.73_m2]    GFR Estimate If Black >90 >60 mL/min/[1.73_m2]    Calcium 8.7 8.5 - 10.1 mg/dL    Bilirubin Total 0.4 0.2 - 1.3 mg/dL    Albumin 3.8 3.4 - 5.0 g/dL    Protein Total 7.7 6.8 - 8.8 g/dL    Alkaline Phosphatase 53 40 - 150 U/L    ALT 38 0 - 50 U/L    AST 20 0 - 45 U/L   CBC with platelets   Result Value Ref Range    WBC 7.1 4.0 - 11.0 10e9/L    RBC Count 4.77 3.8 - 5.2 10e12/L    Hemoglobin 14.4 11.7 - 15.7 g/dL    Hematocrit 43.9 35.0 - 47.0 %    MCV 92 78 - 100 fl    MCH 30.2 26.5 - 33.0 pg    MCHC 32.8 31.5 - 36.5 g/dL    RDW 12.4 10.0 - 15.0 %    Platelet Count 319 150 - 450 10e9/L       Assessment and Plan        1. Chronic fatigue  Fatigue ongoing for past 20 years. Has had evaluated previously and been told \"everything is normal\" and had her symptoms dismissed without any clinical correlation to support. Discussed contributory " obesity, need for routine health screenings with some addressed today, and possible food sensitivities with extreme fatigue after eating. Advised to start journaling food intake and noting when symptoms manifest to help shed more light as to what possible trigger foods there could be. Also advised sleep study with known snoring, obesity, and drowsiness during the daytime.   - Folate  - Vitamin B12  - SLEEP EVALUATION & MANAGEMENT REFERRAL - ADULT -[facility]- Numerous Locations - [phone]; Future  - Ferritin  - TSH with free T4 reflex  - Erythrocyte sedimentation rate auto  - CRP inflammation    2. Encounter for screening mammogram for breast cancer  - Screening Mammogram Digital Bilateral; Future    3. Screening for lipoid disorders  - Lipid panel reflex to direct LDL Fasting    4. Encounter for HCV screening test for low risk patient  - Hepatitis C antibody    5. Screening for HIV without presence of risk factors  - HIV Antigen Antibody Combo    6. Morbid obesity (H)  - Vitamin D Deficiency  - Parathyroid Hormone Intact  - Hemoglobin A1c  - Comprehensive metabolic panel  - CBC with platelets       There are no discontinued medications.    Options for treatment and follow-up care were reviewed with the patient. Nery Bustos  engaged in the decision making process and verbalized understanding of the options discussed and agreed with the final plan.    Zoila Webber, JEREMIAH CNP

## 2019-05-15 ASSESSMENT — ANXIETY QUESTIONNAIRES: GAD7 TOTAL SCORE: 0

## 2019-05-16 ENCOUNTER — OFFICE VISIT (OUTPATIENT)
Dept: FAMILY MEDICINE | Facility: CLINIC | Age: 54
End: 2019-05-16
Payer: COMMERCIAL

## 2019-05-16 VITALS
RESPIRATION RATE: 16 BRPM | DIASTOLIC BLOOD PRESSURE: 85 MMHG | BODY MASS INDEX: 42.35 KG/M2 | SYSTOLIC BLOOD PRESSURE: 125 MMHG | OXYGEN SATURATION: 98 % | WEIGHT: 239 LBS | HEART RATE: 78 BPM | TEMPERATURE: 97.6 F | HEIGHT: 63 IN

## 2019-05-16 DIAGNOSIS — Z00.00 ENCOUNTER FOR PREVENTIVE CARE: Primary | ICD-10-CM

## 2019-05-16 DIAGNOSIS — E55.9 VITAMIN D DEFICIENCY: ICD-10-CM

## 2019-05-16 DIAGNOSIS — R73.03 PRE-DIABETES: ICD-10-CM

## 2019-05-16 DIAGNOSIS — Z12.11 SCREENING FOR COLORECTAL CANCER: ICD-10-CM

## 2019-05-16 DIAGNOSIS — E66.813 CLASS 3 SEVERE OBESITY WITHOUT SERIOUS COMORBIDITY WITH BODY MASS INDEX (BMI) OF 40.0 TO 44.9 IN ADULT, UNSPECIFIED OBESITY TYPE (H): ICD-10-CM

## 2019-05-16 DIAGNOSIS — E66.01 CLASS 3 SEVERE OBESITY WITHOUT SERIOUS COMORBIDITY WITH BODY MASS INDEX (BMI) OF 40.0 TO 44.9 IN ADULT, UNSPECIFIED OBESITY TYPE (H): ICD-10-CM

## 2019-05-16 DIAGNOSIS — Z12.4 SCREENING FOR CERVICAL CANCER: ICD-10-CM

## 2019-05-16 DIAGNOSIS — E78.2 MIXED HYPERLIPIDEMIA: ICD-10-CM

## 2019-05-16 DIAGNOSIS — Z12.12 SCREENING FOR COLORECTAL CANCER: ICD-10-CM

## 2019-05-16 RX ORDER — CALCIUM CARBONATE 500 MG/1
1 TABLET, CHEWABLE ORAL 2 TIMES DAILY
Qty: 60 TABLET | Refills: 11 | Status: SHIPPED | OUTPATIENT
Start: 2019-05-16 | End: 2021-04-28

## 2019-05-16 RX ORDER — ERGOCALCIFEROL 1.25 MG/1
50000 CAPSULE, LIQUID FILLED ORAL
Qty: 8 CAPSULE | Refills: 0 | Status: SHIPPED | OUTPATIENT
Start: 2019-05-16 | End: 2019-07-05

## 2019-05-16 ASSESSMENT — ENCOUNTER SYMPTOMS
ABDOMINAL PAIN: 0
CHILLS: 0
HEADACHES: 0
FEVER: 0
WOUND: 0
ACTIVITY CHANGE: 0
COUGH: 0
TREMORS: 0
FATIGUE: 1
CONSTIPATION: 0
POLYPHAGIA: 0
POLYDIPSIA: 0
PALPITATIONS: 0
BLOOD IN STOOL: 0
ABDOMINAL DISTENTION: 0
NAUSEA: 0
NUMBNESS: 0
DIARRHEA: 0
WEAKNESS: 0
WHEEZING: 0
CHOKING: 0
DIZZINESS: 0
VOMITING: 0
DYSPHORIC MOOD: 0
SLEEP DISTURBANCE: 0
UNEXPECTED WEIGHT CHANGE: 0
APNEA: 0
APPETITE CHANGE: 0
NERVOUS/ANXIOUS: 0
SHORTNESS OF BREATH: 1

## 2019-05-16 ASSESSMENT — MIFFLIN-ST. JEOR: SCORE: 1648.23

## 2019-05-16 NOTE — NURSING NOTE
"53 year old  Chief Complaint   Patient presents with     Physical     Pt. presents to the clinic today for a physical exam.        Blood pressure 125/85, pulse 78, temperature 97.6  F (36.4  C), temperature source Oral, resp. rate 16, height 1.6 m (5' 3\"), weight 108.4 kg (239 lb), last menstrual period 04/30/2019, SpO2 98 %. Body mass index is 42.34 kg/m .  BP completed using cuff size:    There is no problem list on file for this patient.      Wt Readings from Last 2 Encounters:   05/16/19 108.4 kg (239 lb)   05/14/19 106.6 kg (235 lb)     BP Readings from Last 3 Encounters:   05/16/19 125/85   05/14/19 116/79   05/01/19 117/76       No Known Allergies    No current outpatient medications on file.     No current facility-administered medications for this visit.        Social History     Tobacco Use     Smoking status: Never Smoker     Smokeless tobacco: Never Used   Substance Use Topics     Alcohol use: Yes     Frequency: Monthly or less     Drinks per session: 1 or 2     Binge frequency: Never     Comment: occasional drink     Drug use: Never       Honoring Choices - Health Care Directive Guide offered to patient at time of visit.    Health Maintenance Due   Topic Date Due     PREVENTIVE CARE VISIT  1965     PAP SCREENING Q3 YR (SYSTEM ASSIGNED)  06/27/1984     DTAP/TDAP/TD IMMUNIZATION (1 - Tdap) 06/27/1988     MAMMO SCREEN Q2 YR (SYSTEM ASSIGNED)  06/27/2003     COLON CANCER SCREEN (SYSTEM ASSIGNED)  06/27/2013     ZOSTER IMMUNIZATION (1 of 2) 06/27/2013     ADVANCE DIRECTIVE PLANNING Q5 YRS  06/27/2018         There is no immunization history on file for this patient.    No results found for: PAP      Recent Labs   Lab Test 05/14/19  0915 02/05/15  1046   A1C 5.7* 5.5   *  --    HDL 36*  --    TRIG 341*  --    ALT 38  --    CR 0.65  --    GFRESTIMATED >90  --    GFRESTBLACK >90  --    ALBUMIN 3.8  --    POTASSIUM 4.0  --    TSH 1.94 1.03       PHQ-2 ( 1999 Pfizer) 5/14/2019 4/30/2019   Q1: Little " interest or pleasure in doing things 0 0   Q2: Feeling down, depressed or hopeless 0 0   PHQ-2 Score 0 0   Q1: Little interest or pleasure in doing things - Not at all   Q2: Feeling down, depressed or hopeless - Not at all   PHQ-2 Score - 0       PHQ-9 SCORE 5/14/2019   PHQ-9 Total Score 3       HERIBERTO-7 SCORE 5/14/2019   Total Score 0       No flowsheet data found.    Arelis Crandall CMA  May 16, 2019 10:09 AM

## 2019-05-16 NOTE — PATIENT INSTRUCTIONS
- Call and have your mammogram done  - Please complete the home test for colorectal cancer screening.   - Call your insurance company and ask about     * prediabetes and bariatric surgery or medical weight  Management coverage    * Ask about Shingrix coverage/co-pay for the shingles shot  - Fill out and give a copy of the Honoring Choices handout to clinic when filled out  - Mediterranean diet changes as well as continue walking regularly to help cholesterol.

## 2019-05-16 NOTE — PROGRESS NOTES
"Vitamin  SUBJECTIVE:   Nery Bustos is an 53 year old year old woman who presents for preventive health visit. Is current on both dental and vision preventive examinations and meets AHA recommendations of at least 150 minutes of exercise through walking weekly. 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 Continues to have periods however has started perimenopause and her cycles are now much more irregular than they have been, estimates one every 1-4 months which lasts 4-5 days and described as \"moderate\".       Healthy Habits:    Amount of exercise or daily activities, outside of work: walking 5 times/week    Problems taking medications regularly not applicable    Medication side effects: No    Have you had an eye exam in the past two years? yes    Do you see a dentist twice per year? yes    Do you have sleep apnea, excessive snoring or daytime drowsiness? Endorses snoring, has had chronic fatigue for 20+ years, unknown periods of apnea. Has order placed for sleep study.     Water: \"really good\", estimates 8 glasses/day    Caffeine: 16-20oz/day of coffee.     Sleep: sleeps 7-8 hours/night. Typically does not wake throughout the night.     Calcium: \"not that great\"; does not drink milk. Does not eat yogurt, some cheese. Does eat dark leafy green vegetables every other day.     PHQ-9 SCORE 2019   PHQ-9 Total Score 3     HERIBERTO-7 SCORE 2019   Total Score 0       Abuse: Current or Past(Physical, Sexual or Emotional)- No  Do you feel safe in your environment - Yes    Past Medical History:   Diagnosis Date     Fatigue      Fracture 2017    left arm     Obesity      Shortness of breath      Vertigo      Past Surgical History:   Procedure Laterality Date     ABDOMEN SURGERY  , , ,          TONSILLECTOMY       Social History     Socioeconomic History     Marital status: Single     Spouse name: Not on file     Number of children: Not on " "file     Years of education: Not on file     Highest education level: Not on file   Occupational History     Not on file   Social Needs     Financial resource strain: Not on file     Food insecurity:     Worry: Not on file     Inability: Not on file     Transportation needs:     Medical: Not on file     Non-medical: Not on file   Tobacco Use     Smoking status: Never Smoker     Smokeless tobacco: Never Used   Substance and Sexual Activity     Alcohol use: Yes     Frequency: Monthly or less     Drinks per session: 1 or 2     Binge frequency: Never     Comment: occasional drink     Drug use: Never     Sexual activity: Not Currently     Partners: Male     Birth control/protection: Condom   Lifestyle     Physical activity:     Days per week: Not on file     Minutes per session: Not on file     Stress: Not on file   Relationships     Social connections:     Talks on phone: Not on file     Gets together: Not on file     Attends Holiness service: Not on file     Active member of club or organization: Not on file     Attends meetings of clubs or organizations: Not on file     Relationship status: Not on file     Intimate partner violence:     Fear of current or ex partner: Not on file     Emotionally abused: Not on file     Physically abused: Not on file     Forced sexual activity: Not on file   Other Topics Concern     Not on file   Social History Narrative     Not on file     Family History   Problem Relation Age of Onset     Substance Abuse Brother         Consistent overuse     Substance Abuse Mother          of alcoholism, heart attack     Myocardial Infarction Mother      Hypertension Mother      Substance Abuse Brother          while under influence - \"everything\"     Substance Abuse Other          due to alcohol complications in 40's     Cancer Other         supected lung cancer,  in her 40s     Hypertension Father      Macular Degeneration Father      Myocardial Infarction Father 72     Dementia " Maternal Grandmother 60     No Known Problems Maternal Grandfather         passed away in his 90s     Obesity Paternal Grandmother      No Known Problems Paternal Grandfather         passed away in his 90s       If you drink alcohol do you typically have >3 drinks per day or >10 drinks per week? No                     Reviewed orders with patient.  Reviewed health maintenance and updated orders accordingly - Yes        Current Outpatient Medications   Medication Sig Dispense Refill     calcium carbonate (TUMS) 500 MG chewable tablet Take 1 tablet (500 mg) by mouth 2 times daily 60 tablet 11     multivitamin CF FORMULA (CHOICEFUL) chewable tablet Take 1 tablet by mouth daily       vitamin D2 (ERGOCALCIFEROL) 41438 units (1250 mcg) capsule Take 1 capsule (50,000 Units) by mouth every 7 days for 8 doses 8 capsule 0        No Known Allergies     Preventive Health Screenings:  Mammogram: Ordered 5/14/19  Pap: completed today 5/16/19  Colonoscopy: FITT testing ordered today 5/16/19  HIV: completed 5/14/19  STI: Declined  A1c: completed 5/14/19  Lung cancer screening: Not indicated  Lipids: Completed 5/14/19  HCV: Completed 5/14/19  Vaccinations: Tdap last given 2/17/12, believes current on all other vaccines. Encouraged to call insurance company and ask about coverage of Shingrix vaccination.   DEXA: Not indicated at this time.       ROS:  Constitutional: no fevers, chills, night sweats or unintentional weight change   Eyes: no vision change, diplopia or red eyes   Ears, Nose, Mouth, Throat: no tinnitus or hearing change, no epistaxis or nasal discharge, no oral lesions, throat clear   Cardiovascular: no chest pain, palpitations, or pain with walking, no orthopnea or PND   Respiratory: no dyspnea, cough, shortness of breath or wheezing   GI: no nausea, vomiting, diarrhea or constipation, no abdominal pain   : no change in urine, no dysuria or hematuria, no sexual dysfunction   Musculoskeletal: no joint or muscle pain or  "swelling   Integumentary: no concerning lesions or moles   Neuro: no loss of strength or sensation, no numbness or tingling, no tremor, no dizziness, no headache, no gait disturbance   Endo: no polyuria or polydipsia, no temperature intolerance   Heme/Lymph: no concerning bumps, no bleeding problems   Allergy: no environmental allergies   Psych: no depression or anxiety, no sleep problems    OBJECTIVE:   /85 (BP Location: Left arm, Patient Position: Chair, Cuff Size: Adult Regular)   Pulse 78   Temp 97.6  F (36.4  C) (Oral)   Resp 16   Ht 1.6 m (5' 3\")   Wt 108.4 kg (239 lb)   LMP 04/30/2019 (Approximate)   SpO2 98%   BMI 42.34 kg/m    EXAM:  GENERAL: healthy, alert and no distress  EYES: Eyes grossly normal to inspection, PERRL and conjunctivae and sclerae normal  HENT: ear canals and TM's normal, nose and mouth without ulcers or lesions  NECK: no adenopathy, no asymmetry, masses, or scars and thyroid normal to palpation  RESP: lungs clear to auscultation - no rales, rhonchi or wheezes  BREAST: normal without masses, tenderness or nipple discharge and no palpable axillary masses or adenopathy  CV: regular rate and rhythm, normal S1 S2, no S3 or S4, no murmur, click or rub, no peripheral edema and peripheral pulses strong  ABDOMEN: soft, nontender, no hepatosplenomegaly, no masses and bowel sounds normal   (female): normal female external genitalia, normal urethral meatus, vaginal mucosa pink, moist, well rugated, and normal cervix/adnexa/uterus without masses or discharge  RECTAL: normal sphincter tone, no rectal masses  MS: no gross musculoskeletal defects noted, no edema  SKIN: no suspicious lesions or rashes  NEURO: Normal strength and tone, mentation intact and speech normal  PSYCH: mentation appears normal, affect normal/bright    ASSESSMENT/PLAN:   1. Encounter for preventive care  Encouraged continued exercise to help with weight loss and dietary changes. Jess is to call insurance company to " see if new onset pre-diabetes would help her case with bariatric surgery and also inquire about medical weight management clinics. Has ordered mammogram and FITT testing - Jess uncertain if she will be able to tolerate home collection of FITT test and advised to let clinic know if she is unable to complete so traditional colonoscopy can be ordered.   - vitamin D2 (ERGOCALCIFEROL) 11807 units (1250 mcg) capsule; Take 1 capsule (50,000 Units) by mouth every 7 days for 8 doses  Dispense: 8 capsule; Refill: 0  - calcium carbonate (TUMS) 500 MG chewable tablet; Take 1 tablet (500 mg) by mouth 2 times daily  Dispense: 60 tablet; Refill: 11  - Fecal colorectal cancer screen FITT; Future  - HPV High Risk Types DNA Cervical  - Pap imaged thin layer screen with HPV - recommended age 30 - 65 years (select HPV order below)    2. Screening for colorectal cancer  - Fecal colorectal cancer screen FITT; Future    3. Vitamin D deficiency  Plan to recheck vitamin D level in 2 months, transition to daily oral vitamin D once weekly therapy completed.   - vitamin D2 (ERGOCALCIFEROL) 93442 units (1250 mcg) capsule; Take 1 capsule (50,000 Units) by mouth every 7 days for 8 doses  Dispense: 8 capsule; Refill: 0  - Vitamin D Level; Future    4. Pre-diabetes  Encouraged continued walking/exercise and lifestyle changes. Offered referral to nutritionist/dietician to help prevent development of diabetes and Jess declined stating she has already met with bariatric nutritionist and would like to continue with her if possible.     5. Mixed hyperlipidemia  The 10-year ASCVD risk score (Nabilawade MORRIS Jr., et al., 2013) is: 3.4%    Values used to calculate the score:      Age: 53 years      Sex: Female      Is Non- : No      Diabetic: No      Tobacco smoker: No      Systolic Blood Pressure: 125 mmHg      Is BP treated: No      HDL Cholesterol: 36 mg/dL      Total Cholesterol: 237 mg/dL  With current ASCVD risk score does not qualify  "for statin therapy. Discussed dietary changes to help lower LDL, triglycerides and informational handouts from clinic given to patient.     6. Screening for cervical cancer  Uncertain of when last pap was completed; collected today. Informed of new guidelines of screening every 3-5 years if no abnormalities.   - HPV High Risk Types DNA Cervical  - Pap imaged thin layer screen with HPV - recommended age 30 - 65 years (select HPV order below)    7. Class 3 severe obesity without serious comorbidity with body mass index of 40.0-440.9 in adult, unspecified obesity type  As above: Encouraged continued exercise to help with weight loss and dietary changes. Jess is to call insurance company to see if new onset pre-diabetes would help her case with bariatric surgery and also inquire about medical weight management clinics.     COUNSELING:       BP Screening:   Last 3 BP Readings:    BP Readings from Last 3 Encounters:   05/16/19 125/85   05/14/19 116/79   05/01/19 117/76       The following was recommended to the patient:  Re-screen BP within a year and recommended lifestyle modifications    Reports that she has never smoked. She has never used smokeless tobacco.    Estimated body mass index is 42.34 kg/(m^2) as calculated from the following:    Height as of this encounter: 5' 3\" (1.6 m).    Weight as of this encounter: 239 lb (108.4 kg).   Weight management plan: Discussed healthy diet and exercise guidelines and is to call insurance to see if diagnosis of pre-diabetes helps qualify her for bariatric surgery or medical weight management.    Counseling Resources:  ATP IV Guidelines  Pooled Cohorts Equation Calculator  ICSI Preventive Guidelines  Dietary Guidelines for Americans, 2010  USDA's MyPlate  ASA Prophylaxis  Lung CA Screening    Options for treatment and follow-up care were reviewed with the patient. Nery Bustos   engaged in the decision making process and verbalized understanding of the options discussed " and agreed with the final plan.

## 2019-05-20 LAB
COPATH REPORT: NORMAL
PAP: NORMAL

## 2019-05-22 LAB
FINAL DIAGNOSIS: NORMAL
HPV HR 12 DNA CVX QL NAA+PROBE: NEGATIVE
HPV16 DNA SPEC QL NAA+PROBE: NEGATIVE
HPV18 DNA SPEC QL NAA+PROBE: NEGATIVE
SPECIMEN DESCRIPTION: NORMAL
SPECIMEN SOURCE CVX/VAG CYTO: NORMAL

## 2019-10-04 ENCOUNTER — HEALTH MAINTENANCE LETTER (OUTPATIENT)
Age: 54
End: 2019-10-04

## 2020-04-23 ENCOUNTER — VIRTUAL VISIT (OUTPATIENT)
Dept: FAMILY MEDICINE | Facility: CLINIC | Age: 55
End: 2020-04-23
Payer: COMMERCIAL

## 2020-04-23 DIAGNOSIS — F69 MENTAL AND BEHAVIORAL PROBLEM IN ADULT: Primary | ICD-10-CM

## 2020-04-23 DIAGNOSIS — F48.9 MENTAL AND BEHAVIORAL PROBLEM IN ADULT: Primary | ICD-10-CM

## 2020-04-23 RX ORDER — BUPROPION HYDROCHLORIDE 150 MG/1
150 TABLET ORAL EVERY MORNING
Qty: 30 TABLET | Refills: 1 | Status: SHIPPED | OUTPATIENT
Start: 2020-04-23 | End: 2020-07-01

## 2020-04-23 NOTE — PROGRESS NOTES
"Nery Bustos is a 54 year old female who is being evaluated via a billable video visit.      The patient has been notified of following:     \"This video visit will be conducted via a call between you and your physician/provider. We have found that certain health care needs can be provided without the need for an in-person physical exam.  This service lets us provide the care you need with a video conversation.  If a prescription is necessary we can send it directly to your pharmacy.  If lab work is needed we can place an order for that and you can then stop by our lab to have the test done at a later time.    Video visits are billed at different rates depending on your insurance coverage.  Please reach out to your insurance provider with any questions.    If during the course of the call the physician/provider feels a video visit is not appropriate, you will not be charged for this service.\"    Patient has given verbal consent for Video visit? Yes    How would you like to obtain your AVS? Aishwarya    Patient would like the video invitation sent by: Send to e-mail at: [email or web address]    Will anyone else be joining your video visit? No     This video was changed from [employer] to [employer] due to problems on the patient's side adjusting her equipment properly.    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  She describes herself as in the top 1% of whatever parameter describes her work, in the nation.  She works a lot and is very successful.  Right now she is having a very difficult time staying focused.  She feels like over the past 1-2 years she has noticed her mind wandering a lot, and she has found ways to adapt over more time than that with the same issues.    She did fairly well in high school, when she went to college in her 30's she " "had a very hard time focusing. She feels that mostly she adapted to her concentration issues.  She feels her mind wandered a lot, but that she was successful in school.  She is eating well, she likes to get 6 hours of sleep per night, she is doing that mostly.  Her work is stressful but not more so than in the past.  Being at home has been good for her exercise routine, she feels good about that.    She has good support, she loves her children and grandchildren and naturally misses them.    Review of Systems   ROS COMP: Constitutional, HEENT, cardiovascular, pulmonary, gi and gu systems are negative, except as otherwise noted.      Objective    There were no vitals taken for this visit.  Estimated body mass index is 42.34 kg/m  as calculated from the following:    Height as of 5/16/19: 1.6 m (5' 3\").    Weight as of 5/16/19: 108.4 kg (239 lb).  Physical Exam     GENERAL: healthy, alert and no distress  EYES: Eyes grossly normal to inspection, conjunctivae and sclerae normal  RESP: no audible wheeze, cough, or visible cyanosis.  No visible retractions or increased work of breathing.  Able to speak fully in complete sentences.  NEURO: Cranial nerves grossly intact, mentation intact and speech normal  PSYCH: mentation appears normal, affect normal/bright, judgement and insight intact, normal speech and appearance well-groomed    Assessment & Plan     1. Mental and behavioral problem in adult  We talked at length about her concerns, her need to stabilize at this time.  She would like to try something to help her now.      - buPROPion (WELLBUTRIN XL) 150 MG 24 hr tablet; Take 1 tablet (150 mg) by mouth every morning  Dispense: 30 tablet; Refill: 1     BMI:   Estimated body mass index is 42.34 kg/m  as calculated from the following:    Height as of 5/16/19: 1.6 m (5' 3\").    Weight as of 5/16/19: 108.4 kg (239 lb).   Weight management plan: Discussed healthy diet and exercise guidelines    Follow up in 2 weeks.     Corinne" KENJI Rodriguez NP  [facility]      Video-Visit Details    Type of service:  Video Visit    Video End Time: 1330    Originating Location (pt. Location): Home    Distant Location (provider location):  [facility]     Mode of Communication:  Video Conference via [employer]    No follow-ups on file.       Corinne Rodriguez NP

## 2020-04-23 NOTE — NURSING NOTE
Nery Bustos  1965      Please answer the questions below, rating yourself on each of the criteria shown using the  scale on the right side of the page. As you answer each question, place an X in the box that  best describes how you have felt and conducted yourself over the past 6 months.     Answers can be:  Never  Rarely  Sometimes  Often  Very Often    1. How often do you have trouble wrapping up the final details of a project,  once the challenging parts have been done? Often    2. How often do you have difficulty getting things in order when you have to do  a task that requires organization? Sometimes    3. How often do you have problems remembering appointments or obligations? Sometimes    4. When you have a task that requires a lot of thought, how often do you avoid  or delay getting started? Often    5. How often do you fidget or squirm with your hands or feet when you have  to sit down for a long time? Rarely    6. How often do you feel overly active and compelled to do things, like you  were driven by a motor? Often    7. How often do you make careless mistakes when you have to work on a boring or  difficult project? Rarely    8. How often do you have difficulty keeping your attention when you are doing boring  or repetitive work? Sometimes    9. How often do you have difficulty concentrating on what people say to you,  even when they are speaking to you directly? Sometimes    10. How often do you misplace or have difficulty finding things at home or at work? Often    11. How often are you distracted by activity or noise around you? Often    12. How often do you leave your seat in meetings or other situations in which  you are expected to remain seated? Never    13. How often do you feel restless or fidgety? Rarely    14. How often do you have difficulty unwinding and relaxing when you have time   to yourself? Sometimes    15. How often do you find yourself talking too much when you are in  social situations? Rarely    16. When you re in a conversation, how often do you find yourself finishing  the sentences of the people you are talking to, before they can finish  them themselves? Sometimes    17. How often do you have difficulty waiting your turn in situations when  turn taking is required? Rarely    18. How often do you interrupt others when they are busy? Sometimes

## 2020-06-28 DIAGNOSIS — F48.9 MENTAL AND BEHAVIORAL PROBLEM IN ADULT: ICD-10-CM

## 2020-06-28 DIAGNOSIS — F69 MENTAL AND BEHAVIORAL PROBLEM IN ADULT: ICD-10-CM

## 2020-07-01 NOTE — TELEPHONE ENCOUNTER
buPROPion (WELLBUTRIN XL) 150 MG 24 hr tablet  Take 1 tablet (150 mg) by mouth every morning     Last Written Prescription Date:  4/23/20  Last Fill Quantity: 30,   # refills: 1  Last Office Visit : 4/23/20  - Follow up in 2 weeks.   Future Office visit:  none    Routing refill request to provider for review/approval because:  Office visit 1-2 months after initial prescription or as instructed by provider. Per last office visit, f/u in 2 weeks.     Scheduling has been notified to contact the pt for appointment.

## 2020-07-02 RX ORDER — BUPROPION HYDROCHLORIDE 150 MG/1
150 TABLET ORAL EVERY MORNING
Qty: 30 TABLET | Refills: 1 | Status: SHIPPED | OUTPATIENT
Start: 2020-07-02 | End: 2020-07-03

## 2020-07-02 NOTE — TELEPHONE ENCOUNTER
LVM letting patient know for more refills they will need to be seen in clinic. I also let them know they were supposed to follow up in 2 weeks.     Lluvia Pierre, CMA

## 2020-07-03 DIAGNOSIS — F69 MENTAL AND BEHAVIORAL PROBLEM IN ADULT: ICD-10-CM

## 2020-07-03 DIAGNOSIS — F48.9 MENTAL AND BEHAVIORAL PROBLEM IN ADULT: ICD-10-CM

## 2020-07-03 RX ORDER — BUPROPION HYDROCHLORIDE 150 MG/1
150 TABLET ORAL EVERY MORNING
Qty: 30 TABLET | Refills: 1 | Status: SHIPPED | OUTPATIENT
Start: 2020-07-03 | End: 2020-07-06

## 2020-07-06 ENCOUNTER — VIRTUAL VISIT (OUTPATIENT)
Dept: FAMILY MEDICINE | Facility: CLINIC | Age: 55
End: 2020-07-06
Payer: COMMERCIAL

## 2020-07-06 DIAGNOSIS — F69 MENTAL AND BEHAVIORAL PROBLEM IN ADULT: ICD-10-CM

## 2020-07-06 DIAGNOSIS — F48.9 MENTAL AND BEHAVIORAL PROBLEM IN ADULT: ICD-10-CM

## 2020-07-06 RX ORDER — BUPROPION HYDROCHLORIDE 300 MG/1
300 TABLET ORAL EVERY MORNING
Qty: 90 TABLET | Refills: 3 | Status: SHIPPED | OUTPATIENT
Start: 2020-07-06 | End: 2021-04-28

## 2020-07-06 NOTE — PROGRESS NOTES
"Nery Bustos is a 55 year old female who is being evaluated via a billable telephone visit.      The patient has been notified of following:     \"This telephone visit will be conducted via a call between you and your physician/provider. We have found that certain health care needs can be provided without the need for a physical exam.  This service lets us provide the care you need with a short phone conversation.  If a prescription is necessary we can send it directly to your pharmacy.  If lab work is needed we can place an order for that and you can then stop by our lab to have the test done at a later time.    Telephone visits are billed at different rates depending on your insurance coverage. During this emergency period, for some insurers they may be billed the same as an in-person visit.  Please reach out to your insurance provider with any questions.    If during the course of the call the physician/provider feels a telephone visit is not appropriate, you will not be charged for this service.\"    Patient has given verbal consent for Telephone visit?  Yes    What phone number would you like to be contacted at? [phone]    How would you like to obtain your AVS? Lynnehart    Subjective     Nery Bustos is a 55 year old female who presents via phone visit today for the following health issues:    Jess is calling to discuss her buproprion.  She feels that she would like to increase the 150 mg to 300 mg /day.  She feels that she is somewhat more attentive, but not as much as she would like to be.  She has not had any uncomfortable side effects.    Review of Systems   Constitutional, HEENT, cardiovascular, pulmonary, gi and gu systems are negative, except as otherwise noted.       Objective   Reported vitals:  There were no vitals taken for this visit.   healthy, alert and no distress  PSYCH: Alert and oriented times 3; coherent speech, normal   rate and volume, able to articulate logical thoughts, " able   to abstract reason, no tangential thoughts, no hallucinations   or delusions  Her affect is normal  RESP: No cough, no audible wheezing, able to talk in full sentences  Remainder of exam unable to be completed due to telephone visits    Assessment/Plan:  1. Mental and behavioral problem in adult  We will increase the dose.  Jess will MyChart message me in 2 weeks.  - buPROPion (WELLBUTRIN XL) 300 MG 24 hr tablet; Take 1 tablet (300 mg) by mouth every morning  Dispense: 90 tablet; Refill: 3    Phone call duration:  10 minutes    Corinne Rodriguez, NP

## 2020-11-07 ENCOUNTER — HEALTH MAINTENANCE LETTER (OUTPATIENT)
Age: 55
End: 2020-11-07

## 2021-04-27 ENCOUNTER — VIRTUAL VISIT (OUTPATIENT)
Dept: FAMILY MEDICINE | Facility: CLINIC | Age: 56
End: 2021-04-27
Payer: COMMERCIAL

## 2021-04-27 DIAGNOSIS — F90.8 ATTENTION DEFICIT HYPERACTIVITY DISORDER (ADHD), OTHER TYPE: Primary | ICD-10-CM

## 2021-04-27 PROCEDURE — 99213 OFFICE O/P EST LOW 20 MIN: CPT | Mod: 95 | Performed by: NURSE PRACTITIONER

## 2021-04-27 ASSESSMENT — PATIENT HEALTH QUESTIONNAIRE - PHQ9: SUM OF ALL RESPONSES TO PHQ QUESTIONS 1-9: 4

## 2021-04-27 NOTE — PROGRESS NOTES
Jess is a 55 year old who is being evaluated via a billable telephone visit.      What phone number would you like to be contacted at? [phone]  How would you like to obtain your AVS? Aishwarya Vázquez   Jess is a 55 year old who presents for the following health issues.  She continues to experience difficulty concentrating and with focus at work.  She has a job that requires long hours and significant focus, it is getting harder and harder for her.  We started her on bupropion, hoping that might help the situation without going to a stimulant medication.  It is not helping.      Jess was not diagnosed in childhood, this has gotten progressively worse recently.      Review of Systems   CONSTITUTIONAL: NEGATIVE for fever, chills, change in weight  ENT/MOUTH: NEGATIVE for ear, mouth and throat problems  RESP: NEGATIVE for significant cough or SOB  CV: NEGATIVE for chest pain, palpitations or peripheral edema      Objective       Vitals:  No vitals were obtained today due to virtual visit.    Physical Exam   healthy, alert and no distress  PSYCH: Alert and oriented times 3; coherent speech, normal   rate and volume, able to articulate logical thoughts, able   to abstract reason, no tangential thoughts, no hallucinations   or delusions  Her affect is normal  RESP: No cough, no audible wheezing, able to talk in full sentences  Remainder of exam unable to be completed due to telephone visits    (F90.8) Attention deficit hyperactivity disorder (ADHD), other type  (primary encounter diagnosis)  Comment: looking for provider for neuropsychological evaluation to diagnose attention deficit potential  Plan: NEUROPSYCHOLOGY REFERRAL    [facility]  ACP    Follow up after evaluation.        Phone call duration: 7 minutes

## 2021-05-26 ENCOUNTER — RECORDS - HEALTHEAST (OUTPATIENT)
Dept: ADMINISTRATIVE | Facility: CLINIC | Age: 56
End: 2021-05-26

## 2021-09-11 ENCOUNTER — HEALTH MAINTENANCE LETTER (OUTPATIENT)
Age: 56
End: 2021-09-11

## 2021-11-06 ENCOUNTER — HEALTH MAINTENANCE LETTER (OUTPATIENT)
Age: 56
End: 2021-11-06

## 2022-01-01 ENCOUNTER — HEALTH MAINTENANCE LETTER (OUTPATIENT)
Age: 57
End: 2022-01-01

## 2022-10-29 ENCOUNTER — HEALTH MAINTENANCE LETTER (OUTPATIENT)
Age: 57
End: 2022-10-29

## 2023-03-10 ENCOUNTER — OFFICE VISIT (OUTPATIENT)
Dept: FAMILY MEDICINE | Facility: CLINIC | Age: 58
End: 2023-03-10
Payer: COMMERCIAL

## 2023-03-10 VITALS
HEIGHT: 64 IN | HEART RATE: 104 BPM | WEIGHT: 211 LBS | SYSTOLIC BLOOD PRESSURE: 125 MMHG | BODY MASS INDEX: 36.02 KG/M2 | DIASTOLIC BLOOD PRESSURE: 81 MMHG | TEMPERATURE: 98.8 F | OXYGEN SATURATION: 95 %

## 2023-03-10 DIAGNOSIS — Z48.02 VISIT FOR SUTURE REMOVAL: Primary | ICD-10-CM

## 2023-03-10 NOTE — NURSING NOTE
"ROOM:3  MARIAMA CERON    Preferred Name: Jess     How did you hear about us?  Current Patient    57 year old  Chief Complaint   Patient presents with     Suture Removal     Left hand        Blood pressure 125/81, pulse 104, temperature 98.8  F (37.1  C), temperature source Oral, height 1.636 m (5' 4.4\"), weight 95.7 kg (211 lb), SpO2 95 %. Body mass index is 35.77 kg/m .  BP completed using cuff size:        Patient Active Problem List   Diagnosis     Pre-diabetes     Vitamin D deficiency     Mixed hyperlipidemia       Wt Readings from Last 2 Encounters:   03/10/23 95.7 kg (211 lb)   05/16/19 108.4 kg (239 lb)     BP Readings from Last 3 Encounters:   03/10/23 125/81   05/16/19 125/85   05/14/19 116/79       No Known Allergies    Current Outpatient Medications   Medication     multivitamin CF FORMULA (CHOICEFUL) chewable tablet     No current facility-administered medications for this visit.       Social History     Tobacco Use     Smoking status: Never     Smokeless tobacco: Never   Substance Use Topics     Alcohol use: Yes     Comment: occasional drink     Drug use: Never       Honoring Choices - Health Care Directive Guide offered to patient at time of visit.    Health Maintenance Due   Topic Date Due     ADVANCE CARE PLANNING  Never done     MAMMO SCREENING  Never done     HEPATITIS B IMMUNIZATION (1 of 3 - 3-dose series) Never done     COLORECTAL CANCER SCREENING  Never done     ZOSTER IMMUNIZATION (1 of 2) Never done     YEARLY PREVENTIVE VISIT  05/16/2020     COVID-19 Vaccine (4 - Booster for Moderna series) 01/14/2022     DTAP/TDAP/TD IMMUNIZATION (2 - Td or Tdap) 02/17/2022     INFLUENZA VACCINE (1) 09/01/2022     PHQ-2 (once per calendar year)  01/01/2023       Immunization History   Administered Date(s) Administered     COVID-19 Vaccine 18+ (Moderna) 03/25/2021, 05/14/2021, 11/19/2021     TDAP Vaccine (Boostrix) 02/17/2012       Lab Results   Component Value Date    PAP NIL 05/16/2019       Recent Labs "   Lab Test 05/14/19  0915 02/05/15  1046   A1C 5.7* 5.5   *  --    HDL 36*  --    TRIG 341*  --    ALT 38  --    CR 0.65  --    GFRESTIMATED >90  --    GFRESTBLACK >90  --    ALBUMIN 3.8  --    POTASSIUM 4.0  --    TSH 1.94 1.03       PHQ-2 ( 1999 Pfizer) 4/23/2020 5/14/2019   Q1: Little interest or pleasure in doing things 0 0   Q2: Feeling down, depressed or hopeless 0 0   PHQ-2 Score 0 0   PHQ-2 Total Score (12-17 Years)- Positive if 3 or more points; Administer PHQ-A if positive 0 0   Q1: Little interest or pleasure in doing things - -   Q2: Feeling down, depressed or hopeless - -   PHQ-2 Score - -       PHQ-9 SCORE 5/14/2019 7/3/2020 4/27/2021   PHQ-9 Total Score MyChart - 2 (Minimal depression) -   PHQ-9 Total Score 3 2 4       HERIBERTO-7 SCORE 5/14/2019 7/3/2020   Total Score - 3 (minimal anxiety)   Total Score 0 3       No flowsheet data found.    Christiano Figueroa    March 10, 2023 3:01 PM

## 2023-03-10 NOTE — PROGRESS NOTES
"Today's Date: Mar 10, 2023     Patient Nery Bustos 1965 presents to the clinic today to address   Chief Complaint   Patient presents with     Suture Removal     Left hand              SUBJECTIVE     History of Present Illness:    57-year-old female presents for left hand suture removal.  On 23, patient was visiting her [employer] in [place] when she cut her left palm on a rock while swimming. She went to an urgent care for an I&D. They were unable to find/remove any pieces of rock so she went to another outpatient clinic for a second I&D. The second provider was unable to find any foreign objects and closed the wound the 3 sutures. She was started on keflex. She denies acute concerns/symptoms at time of exam.     Review of Systems   Constitutional, HEENT, cardiovascular, pulmonary, gi and gu systems are negative, except as otherwise noted.      No Known Allergies     Current Outpatient Medications   Medication Instructions     multivitamin CF FORMULA (CHOICEFUL) chewable tablet 1 tablet, Oral, DAILY       Past Medical History:   Diagnosis Date     Fatigue      Fracture 2017    left arm     Mixed hyperlipidemia      Obesity      Shortness of breath      Vertigo      Vitamin D deficiency         Family History   Problem Relation Age of Onset     Substance Abuse Brother         Consistent overuse     Substance Abuse Mother          of alcoholism, heart attack     Myocardial Infarction Mother      Hypertension Mother      Substance Abuse Brother          while under influence - \"everything\"     Substance Abuse Other          due to alcohol complications in 40's     Cancer Other         supected lung cancer,  in her 40s     Hypertension Father      Macular Degeneration Father      Myocardial Infarction Father 72     Dementia Maternal Grandmother 60     No Known Problems Maternal Grandfather         passed away in his 90s     Obesity Paternal Grandmother      No Known Problems Paternal " "Grandfather         passed away in his 90s        Social History     Tobacco Use     Smoking status: Never     Smokeless tobacco: Never   Substance Use Topics     Alcohol use: Yes     Comment: occasional drink     Drug use: Never        History   Sexual Activity     Sexual activity: Not Currently     Partners: Male     Birth control/ protection: Condom        PHQ 5/14/2019 7/3/2020 4/27/2021   PHQ-9 Total Score 3 2 4   Q9: Thoughts of better off dead/self-harm past 2 weeks Not at all Not at all Not at all        Immunization History   Administered Date(s) Administered     COVID-19 Vaccine 18+ (Moderna) 03/25/2021, 05/14/2021, 11/19/2021     TDAP Vaccine (Boostrix) 02/17/2012               OBJECTIVE     /81   Pulse 104   Temp 98.8  F (37.1  C) (Oral)   Ht 1.636 m (5' 4.4\")   Wt 95.7 kg (211 lb)   SpO2 95%   BMI 35.77 kg/m       Labs:  Lab Results   Component Value Date    WBC 7.1 05/14/2019    HGB 14.4 05/14/2019    HCT 43.9 05/14/2019     05/14/2019    CHOL 237 (H) 05/14/2019    TRIG 341 (H) 05/14/2019    HDL 36 (L) 05/14/2019    ALT 38 05/14/2019    AST 20 05/14/2019     05/14/2019    BUN 15 05/14/2019    CO2 22 05/14/2019    TSH 1.94 05/14/2019        Physical Exam  Constitutional:       Appearance: Normal appearance.   Eyes:      Extraocular Movements: Extraocular movements intact.   Cardiovascular:      Rate and Rhythm: Tachycardia present.   Pulmonary:      Effort: Pulmonary effort is normal. No respiratory distress.   Musculoskeletal:      Cervical back: Neck supple.   Skin:     Findings: No abscess.      Comments: Healing laceration to L palmar surface with some calloused skin. 3 sutures present. No abscess, induration, erythema, swelling, or discharge noted.    Neurological:      General: No focal deficit present.      Mental Status: She is alert.   Psychiatric:         Thought Content: Thought content normal.         Judgment: Judgment normal.        Suture removal- 3 sutures " removed from L palmar surface laceration site. Patient tolerated removal well without complication. Site covered with bacitracin ointment and bandaid.         ASSESSMENT/PLAN     1. Visit for suture removal  Patient tolerated suture removal without complication. Advised to complete antibiotics and keep the site clean. Advised to return to the clinic with any redness, discharge, fevers, chills, etc.        Follow-Up:  - Follow up in as needed, or if symptoms worsen or fail to improve.     Options for treatment and follow-up care were reviewed with the patient. Patient engaged in the decision making process and verbalized understanding of the options discussed and agreed with the final plan.  AVS printed and given to patient.    JEREMIAH Michael [facility]  [address] [place] [id number]  [phone]

## 2023-04-02 ENCOUNTER — HEALTH MAINTENANCE LETTER (OUTPATIENT)
Age: 58
End: 2023-04-02

## 2023-11-11 ENCOUNTER — HEALTH MAINTENANCE LETTER (OUTPATIENT)
Age: 58
End: 2023-11-11

## 2023-11-30 ENCOUNTER — OFFICE VISIT (OUTPATIENT)
Dept: FAMILY MEDICINE | Facility: CLINIC | Age: 58
End: 2023-11-30
Payer: COMMERCIAL

## 2023-11-30 VITALS
WEIGHT: 210 LBS | HEIGHT: 65 IN | SYSTOLIC BLOOD PRESSURE: 128 MMHG | BODY MASS INDEX: 34.99 KG/M2 | HEART RATE: 93 BPM | DIASTOLIC BLOOD PRESSURE: 83 MMHG | OXYGEN SATURATION: 93 % | TEMPERATURE: 98.1 F

## 2023-11-30 DIAGNOSIS — R05.1 ACUTE COUGH: Primary | ICD-10-CM

## 2023-11-30 PROBLEM — E66.812 CLASS 2 SEVERE OBESITY DUE TO EXCESS CALORIES WITH SERIOUS COMORBIDITY IN ADULT (H): Status: ACTIVE | Noted: 2023-11-30

## 2023-11-30 PROBLEM — E66.01 CLASS 2 SEVERE OBESITY DUE TO EXCESS CALORIES WITH SERIOUS COMORBIDITY IN ADULT (H): Status: ACTIVE | Noted: 2023-11-30

## 2023-11-30 RX ORDER — METHYLPHENIDATE HYDROCHLORIDE 30 MG/1
30 CAPSULE, EXTENDED RELEASE ORAL EVERY MORNING
COMMUNITY

## 2023-11-30 RX ORDER — BENZONATATE 200 MG/1
200 CAPSULE ORAL 3 TIMES DAILY PRN
Qty: 21 CAPSULE | Refills: 0 | Status: SHIPPED | OUTPATIENT
Start: 2023-11-30

## 2023-11-30 NOTE — PROGRESS NOTES
Nery Bustos, who goes by Jess, is a 58 year old female who presents today for nasal congestion since last Monday. She notes having coughing fits that last on average five minutes, post nasal drip, headache, mild congestion with yellow drainage. Negative for fever, sore throat or ear ache. Denies history of allergies or asthma. She has no exposures but she does spend time with her grandchildren. Has tested for covid and was negative and had pink eye 2-3 weeks ago. She has tried neti pot which has helped, along with DayQuil and NyQuil. Has slept well over the last couple days.      ROS: 10 point ROS neg other than the symptoms noted above in the HPI.    Past Medical History:   Diagnosis Date     Fatigue      Fracture 2017    left arm     Mixed hyperlipidemia      Obesity      Shortness of breath      Vertigo      Vitamin D deficiency      Past Surgical History:   Procedure Laterality Date     ABDOMEN SURGERY  , , ,           SECTION       TONSILLECTOMY       Social History     Socioeconomic History     Marital status: Single     Spouse name: Not on file     Number of children: Not on file     Years of education: Not on file     Highest education level: Not on file   Occupational History     Not on file   Tobacco Use     Smoking status: Never     Smokeless tobacco: Never   Substance and Sexual Activity     Alcohol use: Yes     Comment: occasional drink     Drug use: Never     Sexual activity: Not Currently     Partners: Male     Birth control/protection: Condom   Other Topics Concern     Not on file   Social History Narrative     Not on file     Social Determinants of Health     Financial Resource Strain: Not on file   Food Insecurity: Not on file   Transportation Needs: Not on file   Physical Activity: Not on file   Stress: Not on file   Social Connections: Not on file   Interpersonal Safety: Not on file   Housing Stability: Not on file     Family History   Problem Relation  "Age of Onset     Substance Abuse Brother         Consistent overuse     Substance Abuse Mother          of alcoholism, heart attack     Myocardial Infarction Mother      Hypertension Mother      Substance Abuse Brother          while under influence - \"everything\"     Substance Abuse Other          due to alcohol complications in 40's     Cancer Other         supected lung cancer,  in her 40s     Hypertension Father      Macular Degeneration Father      Myocardial Infarction Father 72     Dementia Maternal Grandmother 60     No Known Problems Maternal Grandfather         passed away in his 90s     Obesity Paternal Grandmother      No Known Problems Paternal Grandfather         passed away in his 90s       /83   Pulse 93   Temp 98.1  F (36.7  C) (Oral)   Ht 1.641 m (5' 4.6\")   Wt 95.3 kg (210 lb)   SpO2 93%   BMI 35.38 kg/m        Physical Exam  Constitutional:       Appearance: Normal appearance.   HENT:      Head: Normocephalic.      Right Ear: Tympanic membrane, ear canal and external ear normal.      Left Ear: Tympanic membrane, ear canal and external ear normal.      Nose: Congestion present.      Right Turbinates: Swollen.      Left Turbinates: Swollen.      Right Sinus: No maxillary sinus tenderness or frontal sinus tenderness.      Left Sinus: No maxillary sinus tenderness or frontal sinus tenderness.      Comments: Mild swelling of bilateral turbinates     Mouth/Throat:      Mouth: Mucous membranes are moist.      Pharynx: Oropharynx is clear.   Cardiovascular:      Rate and Rhythm: Normal rate and regular rhythm.   Pulmonary:      Effort: Pulmonary effort is normal.      Breath sounds: Normal breath sounds.   Neurological:      Mental Status: She is alert.   Psychiatric:         Mood and Affect: Mood normal.         Behavior: Behavior normal.     Assessment/Plan:    1. Acute cough  - benzonatate (TESSALON) 200 MG capsule; Take 1 capsule (200 mg) by mouth 3 times daily as needed " for cough  Dispense: 21 capsule; Refill: 0    Recommended rest and fluids. Treat symptoms with tessalon pearls, sudafed and Mucinex D. If symptoms continue to worsen, reach out or take a home test for Covid.     Options for treatment and follow-up care were reviewed with the patient. Patient engaged in the decision making process and verbalized understanding of the options discussed and agreed with the final plan.    Amanda Coleman, YORDAN Student   I, Corinne Rodriguez DNP, APRN, FNP, ANP, reviewed and verified the nurse practitioner (NP)  student's documentation of the patient's history.  I performed the exam and medical decision making activities with the NP student, who was present for learning purposes.              Nimisha Coleman, YORDAN student

## 2023-11-30 NOTE — NURSING NOTE
"ROOM:1  ZAKIYA COWART    Preferred Name: Jess     How did you hear about us?  Current Patient    58 year old  Chief Complaint   Patient presents with     Cough     Symptoms since Monday   Nasal congestion  No fever   Covid test at home neg        Blood pressure 128/83, pulse 93, temperature 98.1  F (36.7  C), temperature source Oral, height 1.641 m (5' 4.6\"), weight 95.3 kg (210 lb), SpO2 93%. Body mass index is 35.38 kg/m .  BP completed using cuff size:        Patient Active Problem List   Diagnosis     Pre-diabetes     Vitamin D deficiency     Mixed hyperlipidemia       Wt Readings from Last 2 Encounters:   11/30/23 95.3 kg (210 lb)   03/10/23 95.7 kg (211 lb)     BP Readings from Last 3 Encounters:   11/30/23 128/83   03/10/23 125/81   05/16/19 125/85       No Known Allergies    Current Outpatient Medications   Medication     methylphenidate (RITALIN LA) 30 MG 24 hr capsule     multivitamin CF FORMULA (CHOICEFUL) chewable tablet     No current facility-administered medications for this visit.       Social History     Tobacco Use     Smoking status: Never     Smokeless tobacco: Never   Substance Use Topics     Alcohol use: Yes     Comment: occasional drink     Drug use: Never       Honoring Choices - Health Care Directive Guide offered to patient at time of visit.    Health Maintenance Due   Topic Date Due     ADVANCE CARE PLANNING  Never done     MAMMO SCREENING  Never done     COLORECTAL CANCER SCREENING  Never done     ZOSTER IMMUNIZATION (1 of 2) Never done     YEARLY PREVENTIVE VISIT  05/16/2020     DTAP/TDAP/TD IMMUNIZATION (2 - Td or Tdap) 02/17/2022     PHQ-2 (once per calendar year)  01/01/2023     RSV VACCINE (Pregnancy & 60+) (1 - 1-dose 60+ series) Never done     INFLUENZA VACCINE (1) 09/01/2023     COVID-19 Vaccine (4 - 2023-24 season) 09/01/2023     HPV TEST  05/16/2024     PAP  05/16/2024       Immunization History   Administered Date(s) Administered     COVID-19 Monovalent 18+ (Moderna) " 03/25/2021, 05/14/2021, 11/19/2021     TDAP Vaccine (Boostrix) 02/17/2012       Lab Results   Component Value Date    PAP NIL 05/16/2019       Recent Labs   Lab Test 05/14/19  0915   A1C 5.7*   *   HDL 36*   TRIG 341*   ALT 38   CR 0.65   GFRESTIMATED >90   GFRESTBLACK >90   ALBUMIN 3.8   POTASSIUM 4.0   TSH 1.94           4/23/2020    12:55 PM 5/14/2019     9:36 AM   PHQ-2 ( 1999 Pfizer)   Q1: Little interest or pleasure in doing things 0 0   Q2: Feeling down, depressed or hopeless 0 0   PHQ-2 Score 0 0   PHQ-2 Total Score (12-17 Years)- Positive if 3 or more points; Administer PHQ-A if positive 0 0           5/14/2019     9:36 AM 7/3/2020    11:04 AM 4/27/2021    11:47 AM   PHQ-9 SCORE   PHQ-9 Total Score MyChart  2 (Minimal depression)    PHQ-9 Total Score 3 2 4           5/14/2019     9:36 AM 7/3/2020    11:03 AM   HERIBERTO-7 SCORE   Total Score  3 (minimal anxiety)   Total Score 0 3            No data to display                Christiano Figueroa    November 30, 2023 7:54 AM

## 2024-06-08 ENCOUNTER — HEALTH MAINTENANCE LETTER (OUTPATIENT)
Age: 59
End: 2024-06-08

## 2024-07-10 ENCOUNTER — OFFICE VISIT (OUTPATIENT)
Dept: FAMILY MEDICINE | Facility: CLINIC | Age: 59
End: 2024-07-10
Payer: COMMERCIAL

## 2024-07-10 VITALS
OXYGEN SATURATION: 97 % | DIASTOLIC BLOOD PRESSURE: 86 MMHG | HEART RATE: 112 BPM | RESPIRATION RATE: 18 BRPM | SYSTOLIC BLOOD PRESSURE: 116 MMHG | TEMPERATURE: 98.4 F

## 2024-07-10 DIAGNOSIS — K11.21 ACUTE PAROTITIS: Primary | ICD-10-CM

## 2024-07-10 ASSESSMENT — ENCOUNTER SYMPTOMS
TROUBLE SWALLOWING: 0
SHORTNESS OF BREATH: 0
HEADACHES: 1
FATIGUE: 1
FEVER: 0
GASTROINTESTINAL NEGATIVE: 1
COUGH: 0
SORE THROAT: 0
CHILLS: 0

## 2024-07-10 ASSESSMENT — PAIN SCALES - GENERAL: PAINLEVEL: MODERATE PAIN (4)

## 2024-07-10 ASSESSMENT — VISUAL ACUITY: OU: 1

## 2024-07-10 NOTE — PROGRESS NOTES
Today's Date: Jul 10, 2024     Patient Nery Bustos 1965 presents to the clinic today to address   Chief Complaint   Patient presents with    Fatigue     Fatigue, swollen lymph nodes, face hurts, left side of face and neck is tender to the touch              SUBJECTIVE     History of Present Illness:      59-year-old female presents to clinic to discuss left-sided facial pain.  Patient reports she woke up this morning and developed left-sided facial pain with tender lymph nodes in her left neck.  Associated symptoms include fatigue and redness to the left side of her face.  She denies fevers or chills.  She denies nasal congestion or sore throat.  She denies eye pain or change in vision.  She endorses headaches that started in her left temporal area and moved towards left occipital.  She recently returned from [place] on June 18.  She denies known sick contacts.  Pain is rated 3 out of 10 today.  Pain is described as dull.  Patient took over-the-counter ibuprofen earlier. No other acute concerns/symptoms at time of exam.          Review of Systems   Constitutional:  Positive for fatigue. Negative for chills and fever.   HENT:  Negative for congestion, sore throat and trouble swallowing.    Eyes:  Negative for visual disturbance.   Respiratory:  Negative for cough and shortness of breath.    Cardiovascular:  Negative for chest pain.   Gastrointestinal: Negative.    Neurological:  Positive for headaches.     Review of Systems   Constitutional, HEENT, cardiovascular, pulmonary, gi and gu systems are negative, except as otherwise noted.      No Known Allergies     Current Outpatient Medications   Medication Instructions    benzonatate (TESSALON) 200 mg, Oral, 3 TIMES DAILY PRN    methylphenidate (RITALIN LA) 30 mg, Oral, EVERY MORNING    multivitamin CF FORMULA (CHOICEFUL) chewable tablet 1 tablet, Oral, DAILY       Past Medical History:   Diagnosis Date    Fatigue     Fracture 12/2017    left arm    Mixed  "hyperlipidemia     Obesity     Shortness of breath     Vertigo     Vitamin D deficiency         Family History   Problem Relation Age of Onset    Substance Abuse Brother         Consistent overuse    Substance Abuse Mother          of alcoholism, heart attack    Myocardial Infarction Mother     Hypertension Mother     Substance Abuse Brother          while under influence - \"everything\"    Substance Abuse Other          due to alcohol complications in 40's    Cancer Other         supected lung cancer,  in her 40s    Hypertension Father     Macular Degeneration Father     Myocardial Infarction Father 72    Dementia Maternal Grandmother 60    No Known Problems Maternal Grandfather         passed away in his 90s    Obesity Paternal Grandmother     No Known Problems Paternal Grandfather         passed away in his 90s        Social History     Tobacco Use    Smoking status: Never    Smokeless tobacco: Never   Substance Use Topics    Alcohol use: Yes     Comment: occasional drink    Drug use: Never        History   Sexual Activity    Sexual activity: Not Currently    Partners: Male    Birth control/ protection: Condom            2019     9:36 AM 7/3/2020    11:04 AM 2021    11:47 AM   PHQ   PHQ-9 Total Score 3 2 4   Q9: Thoughts of better off dead/self-harm past 2 weeks Not at all Not at all Not at all        Immunization History   Administered Date(s) Administered    COVID-19 Monovalent 18+ (Moderna) 2021, 2021, 2021    TDAP Vaccine (Boostrix) 2012                 OBJECTIVE     /86 (BP Location: Left arm, Patient Position: Sitting, Cuff Size: Adult Regular)   Pulse 112   Temp 98.4  F (36.9  C) (Oral)   Resp 18   LMP  (LMP Unknown)   SpO2 97%      Labs:  Lab Results   Component Value Date    WBC 7.1 2019    HGB 14.4 2019    HCT 43.9 2019     2019    CHOL 237 (H) 2019    TRIG 341 (H) 2019    HDL 36 (L) 2019    ALT " 38 05/14/2019    AST 20 05/14/2019     05/14/2019    BUN 15 05/14/2019    CO2 22 05/14/2019    TSH 1.94 05/14/2019        Physical Exam  Constitutional:       General: She is not in acute distress.     Appearance: She is not ill-appearing.   HENT:      Head:      Salivary Glands: Right salivary gland is not diffusely enlarged or tender. Left salivary gland is diffusely enlarged and tender.        Comments: Mild erythema as noted above.     Right Ear: Tympanic membrane normal.      Left Ear: Tympanic membrane normal.      Mouth/Throat:      Pharynx: Oropharynx is clear. No oropharyngeal exudate or posterior oropharyngeal erythema.      Tonsils: No tonsillar exudate.      Comments: No discharge noted from stensen's ducts.  Eyes:      General: Vision grossly intact.      Extraocular Movements: Extraocular movements intact.      Pupils: Pupils are equal, round, and reactive to light.   Cardiovascular:      Rate and Rhythm: Regular rhythm. Tachycardia present.      Heart sounds: Normal heart sounds. No murmur heard.  Pulmonary:      Effort: Pulmonary effort is normal. No respiratory distress.      Breath sounds: Normal breath sounds. No wheezing or rales.   Musculoskeletal:      Cervical back: Neck supple.   Lymphadenopathy:      Cervical: Cervical adenopathy present.      Right cervical: No superficial cervical adenopathy.     Left cervical: Deep cervical adenopathy and posterior cervical adenopathy present. No superficial cervical adenopathy.   Neurological:      General: No focal deficit present.      Mental Status: She is alert.      Cranial Nerves: No facial asymmetry.      Comments: CN II-XII grossly intact.   Psychiatric:         Thought Content: Thought content normal.         Judgment: Judgment normal.               ASSESSMENT/PLAN     1. Acute parotitis    This is a pleasant 59-year-old female presents to clinic discuss left-sided facial pain with tender lymph nodes and left-sided neck and left facial  redness.  Patient is currently afebrile and in no acute distress.  Symptoms and physical exam appear consistent with acute left parotitis.  In the absence of fevers or discharge from Stensen's duct, suspect viral etiology and will start with supportive measures including warm compresses.  Instructed patient to call clinic over the next 2 days should symptoms escalate, will start antibiotics and pursue imaging at that time.          Follow-Up:  - Follow up in 2 day(s), or if symptoms worsen or fail to improve.     Options for treatment and follow-up care were reviewed with the patient. Patient engaged in the decision making process and verbalized understanding of the options discussed and agreed with the final plan.  AVS printed and given to patient.    JEREMIAH Michael [facility]  [address] [place] [id number] [phone]        Note: Chart documentation was done in part with Dragon Voice Recognition software.  Although reviewed after completion, some word and grammatical errors may remain. Please contact author for any clarification or concerns.

## 2024-07-10 NOTE — NURSING NOTE
ROOM:1  MARIAMA CERON    Preferred Name: Jess     How did you hear about us?  Current Patient     Services Provided? No    59 year old  Chief Complaint   Patient presents with    Fatigue     Fatigue, swollen lymph nodes, face hurts, left side of face and neck is tender to the touch        Blood pressure 116/86, pulse 112, temperature 98.4  F (36.9  C), temperature source Oral, resp. rate 18, SpO2 97%. There is no height or weight on file to calculate BMI.  BP completed using cuff size:        Patient Active Problem List   Diagnosis    Pre-diabetes    Vitamin D deficiency    Mixed hyperlipidemia    Class 2 severe obesity due to excess calories with serious comorbidity in adult (H)       Wt Readings from Last 2 Encounters:   11/30/23 95.3 kg (210 lb)   03/10/23 95.7 kg (211 lb)     BP Readings from Last 3 Encounters:   07/10/24 116/86   11/30/23 128/83   03/10/23 125/81       No Known Allergies    Current Outpatient Medications   Medication Sig Dispense Refill    methylphenidate (RITALIN LA) 30 MG 24 hr capsule Take 30 mg by mouth every morning      multivitamin CF FORMULA (CHOICEFUL) chewable tablet Take 1 tablet by mouth daily      benzonatate (TESSALON) 200 MG capsule Take 1 capsule (200 mg) by mouth 3 times daily as needed for cough (Patient not taking: Reported on 7/10/2024) 21 capsule 0     No current facility-administered medications for this visit.       Social History     Tobacco Use    Smoking status: Never    Smokeless tobacco: Never   Substance Use Topics    Alcohol use: Yes     Comment: occasional drink    Drug use: Never       Honoring Choices - Health Care Directive Guide offered to patient at time of visit.    Health Maintenance Due   Topic Date Due    ADVANCE CARE PLANNING  Never done    MAMMO SCREENING  Never done    COLORECTAL CANCER SCREENING  Never done    ZOSTER IMMUNIZATION (1 of 2) Never done    LIPID  05/14/2020    YEARLY PREVENTIVE VISIT  05/16/2020    DTAP/TDAP/TD IMMUNIZATION  (2 - Td or Tdap) 02/17/2022    GLUCOSE  05/14/2022    RSV VACCINE (Pregnancy & 60+) (1 - 1-dose 60+ series) Never done    COVID-19 Vaccine (4 - 2023-24 season) 09/01/2023    HPV TEST  05/16/2024    PAP  05/16/2024       Immunization History   Administered Date(s) Administered    COVID-19 Monovalent 18+ (Moderna) 03/25/2021, 05/14/2021, 11/19/2021    TDAP Vaccine (Boostrix) 02/17/2012       Lab Results   Component Value Date    PAP NIL 05/16/2019       Recent Labs   Lab Test 05/14/19  0915   A1C 5.7*   *   HDL 36*   TRIG 341*   ALT 38   CR 0.65   GFRESTIMATED >90   GFRESTBLACK >90   ALBUMIN 3.8   POTASSIUM 4.0   TSH 1.94           7/10/2024     3:55 PM 4/23/2020    12:55 PM   PHQ-2 ( 1999 Pfizer)   Q1: Little interest or pleasure in doing things 0 0   Q2: Feeling down, depressed or hopeless 0 0   PHQ-2 Score 0 0   PHQ-2 Total Score (12-17 Years)- Positive if 3 or more points; Administer PHQ-A if positive  0           5/14/2019     9:36 AM 7/3/2020    11:04 AM 4/27/2021    11:47 AM   PHQ-9 SCORE   PHQ-9 Total Score MyChart  2 (Minimal depression)    PHQ-9 Total Score 3 2 4           5/14/2019     9:36 AM 7/3/2020    11:03 AM   HERIBERTO-7 SCORE   Total Score  3 (minimal anxiety)   Total Score 0 3            No data to display                Stefania Stein, EMT    July 10, 2024 3:58 PM

## 2024-07-12 ENCOUNTER — OFFICE VISIT (OUTPATIENT)
Dept: FAMILY MEDICINE | Facility: CLINIC | Age: 59
End: 2024-07-12
Payer: COMMERCIAL

## 2024-07-12 ENCOUNTER — TELEPHONE (OUTPATIENT)
Dept: FAMILY MEDICINE | Facility: CLINIC | Age: 59
End: 2024-07-12

## 2024-07-12 VITALS — HEART RATE: 78 BPM | DIASTOLIC BLOOD PRESSURE: 83 MMHG | SYSTOLIC BLOOD PRESSURE: 122 MMHG | TEMPERATURE: 98 F

## 2024-07-12 DIAGNOSIS — K11.21 ACUTE PAROTITIS: Primary | ICD-10-CM

## 2024-07-12 DIAGNOSIS — Z20.818 EXPOSURE TO STREP THROAT: ICD-10-CM

## 2024-07-12 LAB — DEPRECATED S PYO AG THROAT QL EIA: POSITIVE

## 2024-07-12 ASSESSMENT — ENCOUNTER SYMPTOMS
SORE THROAT: 0
TROUBLE SWALLOWING: 0
FACIAL SWELLING: 1
FEVER: 0
SHORTNESS OF BREATH: 0

## 2024-07-12 NOTE — CONFIDENTIAL NOTE
Patient says that she isn't feeling any better and would like to speak to [facility] about what she should do next. Patient also says that she was around someone with strep a week ago and would like to test for it if she can.

## 2024-07-12 NOTE — PROGRESS NOTES
Today's Date: Jul 12, 2024     Patient Nery Bustos 1965 presents to the clinic today to address   Chief Complaint   Patient presents with    Derm Problem     Rash on face    Pharyngitis     Was exposed, swollen glands             SUBJECTIVE     History of Present Illness:    59-year-old female presents to clinic for 2-day follow-up.  Briefly, patient was seen by me on 7/10/2024 where she reported left-sided facial pain, tender lymph nodes, fatigue, and redness to her left cheek.  She was diagnosed with acute parotitis and advised supportive measures at that time.  Today, she reports that she was recently exposed to strep (her granddaughter).  She would like a strep test today.  Supportive care has helped her tender lymph nodes, however, the left-sided redness and swelling have persisted.  She also has an occipital headache today. She denies difficulty breathing or difficulty swallowing.  She took a Zyrtec earlier today which helped her redness.She has been adhering to the supportive care instructions including warm compresses. No other acute concerns/symptoms at time of exam.      Review of Systems   Review of Systems   Constitutional:  Negative for fever.   HENT:  Positive for facial swelling. Negative for sore throat and trouble swallowing.    Respiratory:  Negative for shortness of breath.      Constitutional, HEENT, cardiovascular, pulmonary, gi and gu systems are negative, except as otherwise noted.        No Known Allergies     Current Outpatient Medications   Medication Instructions    benzonatate (TESSALON) 200 mg, Oral, 3 TIMES DAILY PRN    methylphenidate (RITALIN LA) 30 mg, Oral, EVERY MORNING    multivitamin CF FORMULA (CHOICEFUL) chewable tablet 1 tablet, Oral, DAILY       Past Medical History:   Diagnosis Date    Fatigue     Fracture 12/2017    left arm    Mixed hyperlipidemia     Obesity     Shortness of breath     Vertigo     Vitamin D deficiency         Family History   Problem Relation  "Age of Onset    Substance Abuse Brother         Consistent overuse    Substance Abuse Mother          of alcoholism, heart attack    Myocardial Infarction Mother     Hypertension Mother     Substance Abuse Brother          while under influence - \"everything\"    Substance Abuse Other          due to alcohol complications in 40's    Cancer Other         supected lung cancer,  in her 40s    Hypertension Father     Macular Degeneration Father     Myocardial Infarction Father 72    Dementia Maternal Grandmother 60    No Known Problems Maternal Grandfather         passed away in his 90s    Obesity Paternal Grandmother     No Known Problems Paternal Grandfather         passed away in his 90s        Social History     Tobacco Use    Smoking status: Never    Smokeless tobacco: Never   Substance Use Topics    Alcohol use: Yes     Comment: occasional drink    Drug use: Never        History   Sexual Activity    Sexual activity: Not Currently    Partners: Male    Birth control/ protection: Condom            2019     9:36 AM 7/3/2020    11:04 AM 2021    11:47 AM   PHQ   PHQ-9 Total Score 3 2 4   Q9: Thoughts of better off dead/self-harm past 2 weeks Not at all Not at all Not at all        Immunization History   Administered Date(s) Administered    COVID-19 Monovalent 18+ (Moderna) 2021, 2021, 2021    TDAP Vaccine (Boostrix) 2012                 OBJECTIVE     /83 (BP Location: Left arm, Patient Position: Sitting, Cuff Size: Adult Regular)   Pulse 78   Temp 98  F (36.7  C) (Oral)   LMP  (LMP Unknown)      Labs:  Lab Results   Component Value Date    WBC 7.1 2019    HGB 14.4 2019    HCT 43.9 2019     2019    CHOL 237 (H) 2019    TRIG 341 (H) 2019    HDL 36 (L) 2019    ALT 38 2019    AST 20 2019     2019    BUN 15 2019    CO2 22 2019    TSH 1.94 2019        Physical " Exam  Constitutional:       General: She is not in acute distress.     Appearance: She is not ill-appearing.   HENT:      Head:      Salivary Glands: Right salivary gland is not diffusely enlarged. Left salivary gland is diffusely enlarged.        Comments: No periorbital edema noted. Mild erythema and swelling as outlined above. No focal abnormality noted upon palpation of occipital region.     Right Ear: Tympanic membrane normal.      Left Ear: Tympanic membrane normal.      Mouth/Throat:      Pharynx: Oropharynx is clear. No oropharyngeal exudate or posterior oropharyngeal erythema.      Comments: No discharge noted from Hoa's duct.  Eyes:      Extraocular Movements: Extraocular movements intact.      Pupils: Pupils are equal, round, and reactive to light.   Neck:      Comments: Improved when compared to 7/10/24 visit.  Cardiovascular:      Rate and Rhythm: Normal rate and regular rhythm.      Heart sounds: Normal heart sounds. No murmur heard.  Pulmonary:      Effort: Pulmonary effort is normal. No respiratory distress.      Breath sounds: Normal breath sounds. No wheezing or rales.   Musculoskeletal:      Cervical back: Neck supple.      Right lower leg: No edema.      Left lower leg: No edema.   Lymphadenopathy:      Cervical: Cervical adenopathy present.      Left cervical: Posterior cervical adenopathy present. No deep cervical adenopathy.   Neurological:      General: No focal deficit present.      Mental Status: She is alert.   Psychiatric:         Thought Content: Thought content normal.         Judgment: Judgment normal.          Recent Results (from the past 2 hour(s))   Streptococcus A Rapid Screen w/Reflex to PCR - Clinic Collect    Collection Time: 07/12/24  4:07 PM    Specimen: Throat; Swab   Result Value Ref Range    Group A Strep antigen Positive (A) Negative              ASSESSMENT/PLAN     1. Acute parotitis    As noted in HPI.  Despite adherence to supportive care instructions including  warm compresses, patient has persistent left-sided facial redness and swelling.     Patient is afebrile and in no acute distress.  Her rapid strep is positive.  We will start her on 10-day course of Augmentin (opted for Augmentin over amoxicillin considering parotid gland involvement per AAFP guidelines in treating salivary gland disorders). We discussed imaging, since we are starting on antibiotics, we will hold off at this time. We will pursue imaging should symptoms persist/worsen.    - amoxicillin-clavulanate (AUGMENTIN) 875-125 MG tablet; Take 1 tablet by mouth 2 times daily  Dispense: 20 tablet; Refill: 0    2. Exposure to strep throat  As noted above.  - Streptococcus A Rapid Screen w/Reflex to PCR - Clinic Collect  - amoxicillin-clavulanate (AUGMENTIN) 875-125 MG tablet; Take 1 tablet by mouth 2 times daily  Dispense: 20 tablet; Refill: 0    KENJI Goddard., CALVIN Iniguez, & DAREN Watts (2014). Salivary gland disorders. American family physician, 89(11), [phone].    Education provided on at-home supportive measures including intermittent heat.    Follow-Up:  - Follow up in as needed, or if symptoms worsen or fail to improve.     Options for treatment and follow-up care were reviewed with the patient. Patient engaged in the decision making process and verbalized understanding of the options discussed and agreed with the final plan.  AVS printed and given to patient.    JEREMIAH Michael [facility]  [facility]  [address] [id number] [phone]        Note: Chart documentation was done in part with Dragon Voice Recognition software.  Although reviewed after completion, some word and grammatical errors may remain. Please contact author for any clarification or concerns.

## 2025-06-15 ENCOUNTER — HEALTH MAINTENANCE LETTER (OUTPATIENT)
Age: 60
End: 2025-06-15